# Patient Record
Sex: MALE | Race: BLACK OR AFRICAN AMERICAN | NOT HISPANIC OR LATINO | Employment: FULL TIME | URBAN - METROPOLITAN AREA
[De-identification: names, ages, dates, MRNs, and addresses within clinical notes are randomized per-mention and may not be internally consistent; named-entity substitution may affect disease eponyms.]

---

## 2018-01-11 ENCOUNTER — OFFICE VISIT (OUTPATIENT)
Dept: MEDICAL GROUP | Facility: MEDICAL CENTER | Age: 48
End: 2018-01-11
Payer: COMMERCIAL

## 2018-01-11 VITALS
OXYGEN SATURATION: 96 % | DIASTOLIC BLOOD PRESSURE: 86 MMHG | BODY MASS INDEX: 28.12 KG/M2 | WEIGHT: 207.6 LBS | HEIGHT: 72 IN | HEART RATE: 77 BPM | TEMPERATURE: 98.3 F | SYSTOLIC BLOOD PRESSURE: 128 MMHG

## 2018-01-11 DIAGNOSIS — M25.521 RIGHT ELBOW PAIN: ICD-10-CM

## 2018-01-11 DIAGNOSIS — Z12.5 SCREENING FOR MALIGNANT NEOPLASM OF PROSTATE: ICD-10-CM

## 2018-01-11 DIAGNOSIS — Z00.00 PREVENTATIVE HEALTH CARE: ICD-10-CM

## 2018-01-11 PROCEDURE — 99204 OFFICE O/P NEW MOD 45 MIN: CPT | Performed by: PHYSICIAN ASSISTANT

## 2018-01-11 RX ORDER — MELOXICAM 7.5 MG/1
7.5 TABLET ORAL DAILY
Qty: 30 TAB | Refills: 2 | Status: SHIPPED | OUTPATIENT
Start: 2018-01-11 | End: 2018-11-14 | Stop reason: SDUPTHER

## 2018-01-11 ASSESSMENT — PATIENT HEALTH QUESTIONNAIRE - PHQ9: CLINICAL INTERPRETATION OF PHQ2 SCORE: 0

## 2018-01-11 ASSESSMENT — PAIN SCALES - GENERAL: PAINLEVEL: 5=MODERATE PAIN

## 2018-01-11 NOTE — PROGRESS NOTES
Corey Huertas is a 47 y.o. new male here for establishing care.      HPI:    Patient complains of new onset right elbow pain X 1 yr. denies trauma to the area however it is possible that he injured it during heavy lifting in the gym. There is an area with swelling right below the elbow that causes him pain and discomfort. Pain is daily continue as 5 out of 10 which elevates to 9 out of 10 with heavy lifting. States he takes 4 ibuprofen to help with swelling sometimes. Patient has tingling numbness over both hands due to and her needed desk in the night. He is seeing the [place] prosthetic for that.      Current medicines (including changes today)  Current Outpatient Prescriptions   Medication Sig Dispense Refill   • meloxicam (MOBIC) 7.5 MG Tab Take 1 Tab by mouth every day. 30 Tab 2     No current facility-administered medications for this visit.      He  has no past medical history on file.  He  has a past surgical history that includes bone spur excision.  Social History   Substance Use Topics   • Smoking status: Light Tobacco Smoker   • Smokeless tobacco: Never Used   • Alcohol use 0.5 oz/week     1 Cans of beer per week      Comment: socially/weekends     Social History     Social History Narrative   • No narrative on file     Family History   Problem Relation Age of Onset   • Diabetes Mother    • Diabetes Sister    • Diabetes Maternal Grandmother    • Diabetes Maternal Grandfather    • Diabetes Paternal Grandmother    • Diabetes Paternal Grandfather      Family Status   Relation Status   • Mother Alive   • Sister Alive   • Brother Alive   • Sister Alive   • Sister Alive   • Maternal Grandmother    • Maternal Grandfather    • Paternal Grandmother    • Paternal Grandfather        Past medical, surgical, family, and social history is reviewed in Epic chart by me today.   Medications and allergies reviewed in Epic chart by me today.       ROS  General:  No fevers or chills, no night sweats or fatigue.   Eyes: no  "change in vision.   ENT:         Ears: No drainage. No change in hearing      Nose :No epitaxis        Mouth/ throat: No lesions. No sore throat  Resp: No difficulty breathing. No cough, wheezing.  CV: no SOB, no palpitation, no chest pain, no edema  GI: no nausea, no vomiting, no diarrhea or constipations. Bowel regular and normal. No melena or hematochezia. No hematemesis  : no Frequency, no urgency, no dysuria, no hematuria.   Skin: no rashes or abnormal lesions.   Neuro: No seizures, no tingling or numbness.   Endo: no polyuria, no polydypsia, no cold intolerance, no heat intolerance  Psych: no depression, no anxiety, no Drug/Alcohol abuse  Hem: no easy bruising.    As documented in history of present illness  ROS neg:  All other review of systems were reviewed and negative.             Objective:     Blood pressure 128/86, pulse 77, temperature 36.8 °C (98.3 °F), height 1.816 m (5' 11.5\"), weight 94.2 kg (207 lb 9.6 oz), SpO2 96 %. Body mass index is 28.55 kg/m².  Physical Exam:    Constitutional: Well-developed and well-nourished. No distress.   Skin: Skin is warm and dry. No rashes in visible areas.  Nail: w/o pitting, ridging or onychomycosis   Head: Atraumatic without lesions.  Eyes: Equal, round and reactive, conjunctiva clear,sclera non icteric, lids normal.  Ears:  External ears unremarkable. Tympanic membranes clear and intact.  Nose: Nares patent. Septum midline. Turbinates without erythema nor edema. No discharge.    Mouth/Throat: Dentition is good. Tongue normal. Oropharynx is clear and moist. Posterior pharynx without erythema or exudates.  Neck: Supple, trachea midline.  No thyromegaly present. No lymphadenopathy--cervical or supraclavicular  Cardiovascular: Regular rate and rhythm, without any murmurs.  No lower extremity edema. Cap refill < 3sec  Lung:  Clear to auscultation throughout w/o any wheeze or rhonchi. No adventitious sounds.    Abdomen: Soft, non tender, and without distention, No " CVA tenderness  Musculoskeletal: pos swelling without erythema or he below right elbow. Pain with internal rotation of the forearm against resistance  Neurological: speech normal, mental status intact, gait grossly normal  Psychiatric:   Alert and oriented x3, normal affect and mood and behavior    Assessment and Plan:   The following treatment plan was discussed    1. Preventative health care    - CBC WITH DIFFERENTIAL; Future  - COMP METABOLIC PANEL; Future  - LIPID PROFILE; Future  - TSH WITH REFLEX TO FT4; Future  - VITAMIN D,25 HYDROXY; Future    2. Screening for malignant neoplasm of prostate    - PROSTATE SPECIFIC AG SCREENING; Future    3. Right elbow pain    - REFERRAL TO SPORTS MEDICINE  - meloxicam (MOBIC) 7.5 MG Tab; Take 1 Tab by mouth every day.  Dispense: 30 Tab; Refill: 2      Followup: Return if symptoms worsen or fail to improve.         Please note that this dictation was created using voice recognition software. I have made every reasonable attempt to correct obvious errors, but I expect that there are errors of grammar and possibly content that I did not discover before finalizing the note

## 2018-09-26 ENCOUNTER — OFFICE VISIT (OUTPATIENT)
Dept: MEDICAL GROUP | Facility: MEDICAL CENTER | Age: 48
End: 2018-09-26
Payer: COMMERCIAL

## 2018-09-26 VITALS
OXYGEN SATURATION: 96 % | BODY MASS INDEX: 44.1 KG/M2 | HEART RATE: 89 BPM | DIASTOLIC BLOOD PRESSURE: 82 MMHG | SYSTOLIC BLOOD PRESSURE: 138 MMHG | HEIGHT: 71 IN | RESPIRATION RATE: 97 BRPM | WEIGHT: 315 LBS | TEMPERATURE: 98.7 F

## 2018-09-26 DIAGNOSIS — M25.531 PAIN IN BOTH WRISTS: ICD-10-CM

## 2018-09-26 DIAGNOSIS — M25.532 PAIN IN BOTH WRISTS: ICD-10-CM

## 2018-09-26 PROCEDURE — 99214 OFFICE O/P EST MOD 30 MIN: CPT | Performed by: PHYSICIAN ASSISTANT

## 2018-09-26 RX ORDER — PREDNISONE 20 MG/1
TABLET ORAL
Qty: 5 TAB | Refills: 0 | Status: SHIPPED | OUTPATIENT
Start: 2018-09-26 | End: 2020-10-12

## 2018-09-26 ASSESSMENT — PAIN SCALES - GENERAL: PAINLEVEL: 10=SEVERE PAIN

## 2018-09-26 NOTE — PROGRESS NOTES
"Chief Complaint   Patient presents with   • Wrist Pain     (B)        JANA Huertas is a 48 y.o. male here today for bilateral wrist pain X 2 wks. Pain is intermittent, however it is sharp and shooting when it happens.  The pain is over the dorsal aspect of wrists and radiates to the hand without affecting the fingers.  No tingling numbness over the fingers.  States he has history of bilateral wrist tendinitis 20 yrs ago.  Has tried tylenol without much help.  Denies any trauma to the area.  Patient has not been able to do up her body workout.  He is to go to gym multiple times a week.  He is a  and this has been affecting his job.    Past medical, surgical, family, and social history is reviewed in Epic chart by me today.   Medications and allergies reviewed and updated in Epic chart by me today.     ROS:   As documented in history of present illness above    Exam:  Blood pressure 138/82, pulse 89, temperature 37.1 °C (98.7 °F), resp. rate (!) 97, height 1.816 m (5' 11.5\"), weight (!) 204 kg (449 lb 11.8 oz), SpO2 96 %.  Constitutional: Alert, no distress, plus 3 vital signs  Skin:  Warm, dry, no rashes invisible areas  Eye: Equal, round and reactive, conjunctiva clear  MS:  intact.  No sensory deficits, no tenderness to palpation over bony prominence of wrists bilaterally.  Psych: Alert, pleasant, well-groomed, normal affect    A/P:  1. Pain in both wrists  Discussed rest, wrist brace if possible.  I am not convinced this is carpal tunnel due to the location of the pain, pain is over the dorsal aspect of wrists more over the medial part.  - REFERRAL TO SPORTS MEDICINE  - predniSONE (DELTASONE) 20 MG Tab; Take 20 mg day 1-4, take 10 mg day 5-6  Dispense: 5 Tab; Refill: 0  -Meloxicam 7.5 mg daily.  F/u prn   "

## 2018-11-14 DIAGNOSIS — M25.521 RIGHT ELBOW PAIN: ICD-10-CM

## 2018-11-14 RX ORDER — MELOXICAM 7.5 MG/1
7.5 TABLET ORAL DAILY
Qty: 14 TAB | Refills: 0 | Status: SHIPPED | OUTPATIENT
Start: 2018-11-14 | End: 2020-10-12

## 2018-11-14 NOTE — TELEPHONE ENCOUNTER
Was the patient seen in the last year in this department? Yes    Does patient have an active prescription for medications requested? No     Received Request Via: Pharmacy     Ashley Roberts.

## 2018-11-15 NOTE — TELEPHONE ENCOUNTER
Phone Number Called: [phone] (home)     Message: LVM for pt. To call back to schedule an appointment to come in and get refills.    Left Message for patient to call back: yes

## 2020-02-06 ENCOUNTER — OFFICE VISIT (OUTPATIENT)
Dept: URGENT CARE | Facility: CLINIC | Age: 50
End: 2020-02-06
Payer: COMMERCIAL

## 2020-02-06 ENCOUNTER — APPOINTMENT (OUTPATIENT)
Dept: RADIOLOGY | Facility: IMAGING CENTER | Age: 50
End: 2020-02-06
Attending: PHYSICIAN ASSISTANT
Payer: COMMERCIAL

## 2020-02-06 VITALS
HEIGHT: 71 IN | TEMPERATURE: 97 F | RESPIRATION RATE: 16 BRPM | BODY MASS INDEX: 25.2 KG/M2 | DIASTOLIC BLOOD PRESSURE: 96 MMHG | OXYGEN SATURATION: 97 % | WEIGHT: 180 LBS | SYSTOLIC BLOOD PRESSURE: 150 MMHG | HEART RATE: 80 BPM

## 2020-02-06 DIAGNOSIS — R63.4 WEIGHT LOSS: ICD-10-CM

## 2020-02-06 DIAGNOSIS — J98.01 BRONCHOSPASM: ICD-10-CM

## 2020-02-06 DIAGNOSIS — R06.03 ACUTE RESPIRATORY DISTRESS: ICD-10-CM

## 2020-02-06 DIAGNOSIS — I10 HYPERTENSION, UNSPECIFIED TYPE: ICD-10-CM

## 2020-02-06 DIAGNOSIS — J18.9 PNEUMONIA OF RIGHT LOWER LOBE DUE TO INFECTIOUS ORGANISM: ICD-10-CM

## 2020-02-06 DIAGNOSIS — R05.9 COUGH: ICD-10-CM

## 2020-02-06 PROCEDURE — 99204 OFFICE O/P NEW MOD 45 MIN: CPT | Performed by: PHYSICIAN ASSISTANT

## 2020-02-06 PROCEDURE — 71046 X-RAY EXAM CHEST 2 VIEWS: CPT | Mod: TC | Performed by: PHYSICIAN ASSISTANT

## 2020-02-06 RX ORDER — PROMETHAZINE HYDROCHLORIDE AND CODEINE PHOSPHATE 6.25; 1 MG/5ML; MG/5ML
5 SYRUP ORAL EVERY 12 HOURS PRN
Qty: 120 ML | Refills: 0 | Status: SHIPPED | OUTPATIENT
Start: 2020-02-06 | End: 2020-02-13

## 2020-02-06 RX ORDER — ONDANSETRON 4 MG/1
4 TABLET, FILM COATED ORAL EVERY 6 HOURS PRN
Qty: 20 TAB | Refills: 1 | Status: SHIPPED | OUTPATIENT
Start: 2020-02-06 | End: 2020-10-12

## 2020-02-06 RX ORDER — DOXYCYCLINE 100 MG/1
100 CAPSULE ORAL 2 TIMES DAILY
Qty: 10 CAP | Refills: 0 | Status: SHIPPED | OUTPATIENT
Start: 2020-02-06 | End: 2020-02-11

## 2020-02-06 RX ORDER — AMOXICILLIN AND CLAVULANATE POTASSIUM 875; 125 MG/1; MG/1
1 TABLET, FILM COATED ORAL 2 TIMES DAILY
Qty: 10 TAB | Refills: 0 | Status: SHIPPED | OUTPATIENT
Start: 2020-02-06 | End: 2020-02-11

## 2020-02-06 RX ORDER — METHYLPREDNISOLONE 4 MG/1
TABLET ORAL
Qty: 21 TAB | Refills: 0 | Status: SHIPPED | OUTPATIENT
Start: 2020-02-06 | End: 2020-10-12

## 2020-02-06 ASSESSMENT — ENCOUNTER SYMPTOMS
SHORTNESS OF BREATH: 0
WHEEZING: 0
BLOOD IN STOOL: 0
SORE THROAT: 0
FEVER: 1
CONSTIPATION: 1
MYALGIAS: 1
NAUSEA: 1
CHILLS: 1
ABDOMINAL PAIN: 0
DIZZINESS: 1
HEMOPTYSIS: 0
SPUTUM PRODUCTION: 1
COUGH: 1
WEIGHT LOSS: 1
PALPITATIONS: 0
VOMITING: 0
DIARRHEA: 0

## 2020-02-06 NOTE — PROGRESS NOTES
"  Subjective:     Corey Huertas  is a 49 y.o. male who presents for Cough (x 1 wk, productive cough, shortness of breath, dizzness, weakness, shaky, lost 30 lb in 1 wk)       Patient comes clinic complaining of last 1 week of persistent productive coughing.  He notes yellow and green production with coughing.  Denies hemoptysis.  He has noted waxing waning fevers and chills.  Notes he had a fever last weekend resolved for a few days and then returned last night.  Complains of chest pain with coughing.  Notes cough is causing chest congestion.  Patient complains of significant weight loss over the last 1 week.  He approximates over 25 to 30 pounds of weight loss over this interim.  He denies diarrhea or vomiting.  He complains of nausea without abdominal pain.  He notes nausea without eating.  He states he is minimally eaten over the last 1 week.  He notes \"only 2 bowel movements since he has not been eating this week\".  He complains of pleuritic type chest pain.  Denies exertional chest pain.  Denies palpable chest pain.  Of note patient is hypertensive in clinic.  He denies past medical history of hypertension.  Says he has not seen his primary care for over a year and does not check his blood pressure regularly.  He suspects history of pneumonia and bronchitis without clear diagnosis or timeframe. Pt unclear about recent weight loss; patient fairly aggressive and combative with history & questioning. We discuss recommendations to avoid stimulant decongestants or cough cold medicines with elevated blood pressure reading today.  Recommendation for primary care follow-up referral placed today with unclear recent weight loss. Recommendation for ER evaluation with ANY acutely worsening symptoms.     Cough   This is a new problem. The current episode started in the past 7 days. Associated symptoms include chest pain ( Primarily pleuritic, with respirations/cough), chills, ear pain, a fever, myalgias and weight loss. " Pertinent negatives include no hemoptysis, rash, sore throat, shortness of breath or wheezing.   History reviewed. No pertinent past medical history.  Past Surgical History:   Procedure Laterality Date   • BONE SPUR EXCISION       Social History     Socioeconomic History   • Marital status: Unknown     Spouse name: Not on file   • Number of children: Not on file   • Years of education: Not on file   • Highest education level: Not on file   Occupational History   • Not on file   Social Needs   • Financial resource strain: Not on file   • Food insecurity:     Worry: Not on file     Inability: Not on file   • Transportation needs:     Medical: Not on file     Non-medical: Not on file   Tobacco Use   • Smoking status: Light Tobacco Smoker     Types: Cigarettes   • Smokeless tobacco: Never Used   Substance and Sexual Activity   • Alcohol use: Yes     Alcohol/week: 0.5 oz     Types: 1 Cans of beer per week     Comment: socially/weekends   • Drug use: No   • Sexual activity: Yes     Partners: Female   Lifestyle   • Physical activity:     Days per week: Not on file     Minutes per session: Not on file   • Stress: Not on file   Relationships   • Social connections:     Talks on phone: Not on file     Gets together: Not on file     Attends Judaism service: Not on file     Active member of club or organization: Not on file     Attends meetings of clubs or organizations: Not on file     Relationship status: Not on file   • Intimate partner violence:     Fear of current or ex partner: Not on file     Emotionally abused: Not on file     Physically abused: Not on file     Forced sexual activity: Not on file   Other Topics Concern   • Not on file   Social History Narrative   • Not on file      Family History   Problem Relation Age of Onset   • Diabetes Mother    • Diabetes Sister    • Diabetes Maternal Grandmother    • Diabetes Maternal Grandfather    • Diabetes Paternal Grandmother    • Diabetes Paternal Grandfather     Review  "of Systems   Constitutional: Positive for chills, fever, malaise/fatigue and weight loss.   HENT: Positive for congestion and ear pain. Negative for sore throat.    Respiratory: Positive for cough and sputum production. Negative for hemoptysis, shortness of breath and wheezing.    Cardiovascular: Positive for chest pain ( Primarily pleuritic, with respirations/cough). Negative for palpitations and leg swelling.   Gastrointestinal: Positive for constipation and nausea. Negative for abdominal pain, blood in stool, diarrhea, melena and vomiting.   Genitourinary: Negative.    Musculoskeletal: Positive for myalgias.   Skin: Negative for rash.   Neurological: Positive for dizziness.   No Known Allergies   I have worn a mask for the entire encounter with this patient.    Objective:   /96   Pulse 80   Temp 36.1 °C (97 °F) (Temporal)   Resp 16   Ht 1.803 m (5' 11\")   Wt 81.6 kg (180 lb)   SpO2 97%   BMI 25.10 kg/m²   Physical Exam  Vitals signs and nursing note reviewed.   Constitutional:       General: He is not in acute distress.     Appearance: He is well-developed. He is not diaphoretic.   HENT:      Head: Normocephalic and atraumatic.      Right Ear: Tympanic membrane, ear canal and external ear normal.      Left Ear: Tympanic membrane, ear canal and external ear normal.      Nose: Nose normal.      Mouth/Throat:      Pharynx: Uvula midline. Posterior oropharyngeal erythema ( mild PND) present. No oropharyngeal exudate.      Tonsils: No tonsillar abscesses.   Eyes:      General: No scleral icterus.        Right eye: No discharge.         Left eye: No discharge.      Conjunctiva/sclera: Conjunctivae normal.   Neck:      Musculoskeletal: Neck supple.   Cardiovascular:      Rate and Rhythm: Regular rhythm. Tachycardia present.      Pulses: Normal pulses.   Pulmonary:      Effort: Pulmonary effort is normal. No accessory muscle usage or respiratory distress.      Breath sounds: Examination of the right-lower " field reveals rhonchi. Examination of the left-lower field reveals rhonchi. Rhonchi present. No decreased breath sounds, wheezing or rales.   Musculoskeletal: Normal range of motion.   Lymphadenopathy:      Cervical: Cervical adenopathy ( mild bilat) present.   Skin:     General: Skin is warm and dry.      Coloration: Skin is not pale.   Neurological:      Mental Status: He is alert and oriented to person, place, and time.      Coordination: Coordination normal.     ECG - EKG in the office reveals a normal sinus rhythm with a rate of 59. There is no ectopy, no ST elevation, depression, no signs of ischemia or infarct.    CXR -    IMPRESSION:     1.  There is ill-defined right lower lobe opacity suspicious for pneumonitis.            Last Resulted: 02/06/20  1:12 PM            Assessment/Plan:   Assessment    1. Pneumonia of right lower lobe due to infectious organism (HCC)  - DX-CHEST-2 VIEWS; Future  - amoxicillin-clavulanate (AUGMENTIN) 875-125 MG Tab; Take 1 Tab by mouth 2 times a day for 5 days.  Dispense: 10 Tab; Refill: 0  - doxycycline (MONODOX) 100 MG capsule; Take 1 Cap by mouth 2 times a day for 5 days.  Dispense: 10 Cap; Refill: 0  - methylPREDNISolone (MEDROL DOSEPAK) 4 MG Tablet Therapy Pack; Follow schedule on package instructions.  Dispense: 21 Tab; Refill: 0  - ondansetron (ZOFRAN) 4 MG Tab tablet; Take 1 Tab by mouth every 6 hours as needed for Nausea/Vomiting.  Dispense: 20 Tab; Refill: 1    2. Cough  - DX-CHEST-2 VIEWS; Future  - EKG  - promethazine-codeine (PHENERGAN-CODEINE) 6.25-10 MG/5ML Syrup; Take 5 mL by mouth every 12 hours as needed for Cough for up to 7 days.  Dispense: 120 mL; Refill: 0    3. Weight loss  - REFERRAL TO FAMILY PRACTICE    4. Hypertension, unspecified type  - REFERRAL TO FAMILY PRACTICE  Supportive care is reviewed with patient/caregiver - recommend to push PO fluids and electrolytes, Nsaids/tylenol, netti pot/saline irrig, humidifier in home, Cautioned regarding  potential side effects of steroid, avoid nsaids while using   take full course of Rx, take with probiotics, observe for resolution  Return to clinic with lack of resolution or progression of symptoms.    Cautioned regarding potential for sedation with medication.  F/u w/ PCP - referral today  ER precautions with any worsening symptoms are reviewed with patient/caregiver and they do express understanding -- with constellation of complaints patient is directed to ER w/ ANY worsening    Differential diagnosis, natural history, supportive care, and indications for immediate follow-up discussed.

## 2020-02-06 NOTE — LETTER
February 6, 2020       Patient: Corey Huertas   YOB: 1970   Date of Visit: 2/6/2020         To Whom It May Concern:    It is my medical opinion that Corey Huertas should be excused from yesterday, today and tomorrow due to illness.      If you have any questions or concerns, please don't hesitate to call [phone]          Sincerely,          Bret Chinchilla P.A.-C.  Electronically Signed

## 2020-02-14 ENCOUNTER — OFFICE VISIT (OUTPATIENT)
Dept: MEDICAL GROUP | Facility: MEDICAL CENTER | Age: 50
End: 2020-02-14
Payer: COMMERCIAL

## 2020-02-14 VITALS
SYSTOLIC BLOOD PRESSURE: 120 MMHG | TEMPERATURE: 97.8 F | DIASTOLIC BLOOD PRESSURE: 70 MMHG | HEIGHT: 72 IN | WEIGHT: 177.47 LBS | HEART RATE: 80 BPM | OXYGEN SATURATION: 97 % | BODY MASS INDEX: 24.04 KG/M2 | RESPIRATION RATE: 20 BRPM

## 2020-02-14 DIAGNOSIS — J18.9 PNEUMONIA OF RIGHT LOWER LOBE DUE TO INFECTIOUS ORGANISM: ICD-10-CM

## 2020-02-14 PROCEDURE — 99213 OFFICE O/P EST LOW 20 MIN: CPT | Performed by: PHYSICIAN ASSISTANT

## 2020-02-14 ASSESSMENT — PATIENT HEALTH QUESTIONNAIRE - PHQ9: CLINICAL INTERPRETATION OF PHQ2 SCORE: 0

## 2020-02-14 NOTE — PROGRESS NOTES
"Chief Complaint   Patient presents with   • Follow-Up   • Other     letter to return to work       HPI  Corey Huertas is a 49 y.o. male here today for follow-up on right lower lobe pneumonia.  Patient was seen at urgent care and x-ray showed consolidation of the right lower lobe.  Patient was treated for pneumonia with Augmentin and doxycycline with Medrol Dosepak.  States he follows a lot better.  Still has some fatigue and tiredness on exertion.  Cough has resolved.  No fevers or chills, no nausea or vomiting.       Past medical, surgical, family, and social history is reviewed in Epic chart by me today.   Medications and allergies reviewed and updated in Epic chart by me today.     ROS:   As documented in history of present illness above    Exam:  /70 (BP Location: Right arm, Patient Position: Sitting, BP Cuff Size: Adult)   Pulse 80   Temp 36.6 °C (97.8 °F) (Temporal)   Resp 20   Ht 1.816 m (5' 11.5\")   Wt 80.5 kg (177 lb 7.5 oz)   SpO2 97%   Constitutional: Alert, no distress, plus 3 vital signs  Skin:  Warm, dry, no rashes invisible areas  Eye: Equal, round and reactive, conjunctiva clear  Respiratory: Unlabored respiratory effort, lungs clear to auscultation, no wheezes, no rhonchi  Cardiovascular: RRR, no murmur, no lower extremities edema,  Psych: Alert, pleasant, well-groomed, normal affect    A/P:  1. Pneumonia of right lower lobe due to infectious organism (HCC)  Resolved,  Patient was on FMLA, back to work form is signed    F/u prn   "

## 2020-02-14 NOTE — LETTER
February 14, 2020         Patient: Corey Huertas   YOB: 1970   Date of Visit: 2/14/2020           To Whom it May Concern:    Corey Huertas was seen in my clinic on 2/14/2020. He may return to work 2/21/20    If you have any questions or concerns, please don't hesitate to call.        Sincerely,           Ashley Ovalle P.A.-C.  Electronically Signed

## 2020-10-12 ENCOUNTER — OFFICE VISIT (OUTPATIENT)
Dept: MEDICAL GROUP | Facility: MEDICAL CENTER | Age: 50
End: 2020-10-12
Payer: COMMERCIAL

## 2020-10-12 VITALS
BODY MASS INDEX: 25.14 KG/M2 | OXYGEN SATURATION: 100 % | SYSTOLIC BLOOD PRESSURE: 112 MMHG | DIASTOLIC BLOOD PRESSURE: 72 MMHG | RESPIRATION RATE: 16 BRPM | WEIGHT: 185.63 LBS | TEMPERATURE: 98.9 F | HEIGHT: 72 IN | HEART RATE: 67 BPM

## 2020-10-12 DIAGNOSIS — Z23 NEED FOR VACCINATION: ICD-10-CM

## 2020-10-12 DIAGNOSIS — B36.0 TINEA VERSICOLOR: ICD-10-CM

## 2020-10-12 DIAGNOSIS — Z72.0 TOBACCO USE: ICD-10-CM

## 2020-10-12 DIAGNOSIS — M25.522 LEFT ELBOW PAIN: ICD-10-CM

## 2020-10-12 DIAGNOSIS — Z00.00 ENCOUNTER FOR PREVENTIVE CARE: ICD-10-CM

## 2020-10-12 DIAGNOSIS — E78.5 DYSLIPIDEMIA: ICD-10-CM

## 2020-10-12 PROCEDURE — 90715 TDAP VACCINE 7 YRS/> IM: CPT | Performed by: INTERNAL MEDICINE

## 2020-10-12 PROCEDURE — 99204 OFFICE O/P NEW MOD 45 MIN: CPT | Mod: 25 | Performed by: INTERNAL MEDICINE

## 2020-10-12 PROCEDURE — 90471 IMMUNIZATION ADMIN: CPT | Performed by: INTERNAL MEDICINE

## 2020-10-12 RX ORDER — SELENIUM SULFIDE 22.5 MG/ML
1 SHAMPOO TOPICAL DAILY
Qty: 180 ML | Refills: 1 | Status: SHIPPED | OUTPATIENT
Start: 2020-10-12 | End: 2020-10-19

## 2020-10-12 NOTE — PATIENT INSTRUCTIONS
Tennis Elbow    Tennis elbow (lateral epicondylitis) is inflammation of tendons in your outer forearm, near your elbow. Tendons are tissues that connect muscle to bone. When you have tennis elbow, inflammation affects the tendons that you use to bend your wrist and move your hand up. Inflammation occurs in the lower part of the upper arm bone (humerus), where the tendons connect to the bone (lateral epicondyle).  Tennis elbow often affects people who play tennis, but anyone may get the condition from repeatedly extending the wrist or turning the forearm.  What are the causes?  This condition is usually caused by repeatedly extending the wrist, turning the forearm, and using the hands. It can result from sports or work that requires repetitive forearm movements. In some cases, it may be caused by a sudden injury.  What increases the risk?  You are more likely to develop tennis elbow if you play tennis or another racket sport. You also have a higher risk if you frequently use your hands for work. Besides people who play tennis, others at greater risk include:  · Musicians.  · , painters, and plumbers.  · Cooks.  · [place].  · People who work in factories.  · Construction workers.  · Butchers.  · People who use computers.  What are the signs or symptoms?  Symptoms of this condition include:  · Pain and tenderness in the forearm and the outer part of the elbow. Pain may be felt only when using the arm, or it may be there all the time.  · A burning feeling that starts in the elbow and spreads down the forearm.  · A weak  in the hand.  How is this diagnosed?  This condition may be diagnosed based on:  · Your symptoms and medical history.  · A physical exam.  · X-rays.  · MRI.  How is this treated?  Resting and icing your arm is often the first treatment. Your health care provider may also recommend:  · Medicines to reduce pain and inflammation. These may be in the form of a pill, topical gels, or shots of a  steroid medicine (cortisone).  · An elbow strap to reduce stress on the area.  · Physical therapy. This may include massage or exercises.  · An elbow brace to restrict the movements that cause symptoms.  If these treatments do not help relieve your symptoms, your health care provider may recommend surgery to remove damaged muscle and reattach healthy muscle to bone.  Follow these instructions at home:  Activity  · Rest your elbow and wrist and avoid activities that cause symptoms, as told by your health care provider.  · Do physical therapy exercises as instructed.  · If you lift an object, lift it with your palm facing up. This reduces stress on your elbow.  Lifestyle  · If your tennis elbow is caused by sports, check your equipment and make sure that:  ? You are using it correctly.  ? It is the best fit for you.  · If your tennis elbow is caused by work or computer use, take frequent breaks to stretch your arm. Talk with your manager about ways to manage your condition at work.  If you have a brace:  · Wear the brace or strap as told by your health care provider. Remove it only as told by your health care provider.  · Loosen the brace if your fingers tingle, become numb, or turn cold and blue.  · Keep the brace clean.  · If the brace is not waterproof, ask if you may remove it for bathing. If you must keep the brace on while bathing:  ? Do not let it get wet.  ? Cover it with a watertight covering when you take a bath or a shower.  General instructions    · If directed, put ice on the painful area:  ? Put ice in a plastic bag.  ? Place a towel between your skin and the bag.  ? Leave the ice on for 20 minutes, 2-3 times a day.  · Take over-the-counter and prescription medicines only as told by your health care provider.  · Keep all follow-up visits as told by your health care provider. This is important.  Contact a health care provider if:  · You have pain that gets worse or does not get better with  treatment.  · You have numbness or weakness in your forearm, hand, or fingers.  Summary  · Tennis elbow (lateral epicondylitis) is inflammation of tendons in your outer forearm, near your elbow.  · Common symptoms include pain and tenderness in your forearm and the outer part of your elbow.  · This condition is usually caused by repeatedly extending your wrist, turning your forearm, and using your hands.  · The first treatment is often resting and icing your arm to relieve symptoms. Further treatment may include taking medicine, getting physical therapy, wearing a brace or strap, or having surgery.  This information is not intended to replace advice given to you by your health care provider. Make sure you discuss any questions you have with your health care provider.  Document Released: 12/18/2006 Document Revised: 09/13/2019 Document Reviewed: 10/02/2018  [employer] Patient Education © 2020 [employer]

## 2020-10-12 NOTE — PROGRESS NOTES
"New Patient to Establish    Reason to establish: New patient to establish    Corey Huertas is a 50 y.o. male who presents today with the following:    CC:   Chief Complaint   Patient presents with   • Establish Care   • Elbow Pain     L elbow, x 2 mos.       HPI:     Left elbow pain  Left lateral elbow pain, worse with turning wrench     He lifts bumbbells  Now pushup, pull ups  He regularly does kickboxing and lifting    Denies significant injuries        Tinea versicolor  Hypopigmented slight scaly rash on trunk and proximal upper extremities, worse in summer times.       Dyslipidemia  Lifestyle modifications            Current Outpatient Medications:   •  Selenium Sulfide 2.25 % Shampoo, 1 Application by Apply externally route every day for 7 days. Apply to affected area and lather with small amounts of water; leave on skin for 10 minutes, then rinse thoroughly, Disp: 180 mL, Rfl: 1    Allergies, past medical history, past surgical history, medications, family history, social history reviewed and updated.    ROS     Constitutional: Denies fevers or chills  Eyes: Denies changes in vision  Ears/Nose/Throat/Mouth: Denies nasal congestion or sore throat   Cardiovascular: Denies chest pain or palpitations   Respiratory: Denies shortness of breath , Denies cough  Gastrointestinal/Hepatic: Denies abd pain, nausea, vomiting   Genitourinary: Denies dysuria or frequency  Musculoskeletal/Rheum: left lateral elbow tenderness  Neurological: Denies headache  Psychiatric: Denies mood disorder   Endocrine: Denies hx of diabetes or thyroid dysfunction  Heme/Oncology/Lymph Nodes: Denies weight changes or enlarged LNs.    Physical Exam  /72 (BP Location: Left arm, Patient Position: Sitting, BP Cuff Size: Adult)   Pulse 67   Temp 37.2 °C (98.9 °F) (Temporal)   Resp 16   Ht 1.816 m (5' 11.5\")   Wt 84.2 kg (185 lb 10 oz)   SpO2 100%   BMI 25.53 kg/m²   General: Normal appearance.  Well developed, well nourished, no acute " distress.  HEENT: Normocephalic.  Extraocular motion intact. Pupils are equally round, reactive to light and accommodation, conjunctiva clear, no scleral icterus.  Ears: normal shape and contour, ear canals clear, tympanic membranes intact. Hearing intact.  Oropharynx clear, no erythema, edema or exudate noted.  NECK: Thyroid is not enlarged. No JVD.  No carotid bruits. No masses.  Cardiovascular: Regular rhythm and rate.   Respiratory: Normal respiratory effort, clear to auscultation bilaterally. No wheezing/rales/rhonchi.    Abdomen: Bowel sounds present, soft, nontender, nondistended, no rebound, no guarding. No hepatosplenomegaly.  : No suprapubic tenderness. No CVA tenderness.   EXT: left lateral elbow tenderness. no LE edema b/l. No cyanosis.  No clubbing.  Lymph: No cervical, supraclavicular or axillary lymph nodes are palpable  Skin: Warm and dry.  No suspicious lesions or rashes.   Neurologic: No focal deficits.    Psych: AAOx3,  Normal mood and affect, normal judgment and insight, memory within normal limits      Assessment and Plan    1. Left elbow pain  - REFERRAL TO SPORTS MEDICINE  - DX-ELBOW-COMPLETE 3+ LEFT; Future  Continue tylenol prn, topical biofreeze prn  Trial of elbow brace, avoid activities that cause symptoms    2. Dyslipidemia  - Lipid Profile; Future    3. Tinea versicolor  - Selenium Sulfide 2.25 % Shampoo; 1 Application by Apply externally route every day for 7 days. Apply to affected area and lather with small amounts of water; leave on skin for 10 minutes, then rinse thoroughly  Dispense: 180 mL; Refill: 1    4. Encounter for preventive care  - CBC WITH DIFFERENTIAL; Future  - Comp Metabolic Panel; Future  - TSH WITH REFLEX TO FT4; Future  - VITAMIN D,25 HYDROXY; Future  - URINALYSIS; Future    5. Need for vaccination  - Tdap Vaccine =>6YO IM    6. Tobacco use  - Education and counseling provided for smoking cessation.    Patient declines flu shot, due to not feeling well after  getting flu shot every year.    Follow-up:Return if symptoms worsen or fail to improve.    This note was created using voice recognition software. There may be unintended errors in spelling, grammar or content.

## 2020-10-12 NOTE — ASSESSMENT & PLAN NOTE
Smoking 3 cig daily  Hx of smoking 0.25 pack for 10 years  - Education and counseling provided for smoking cessation.

## 2020-10-12 NOTE — ASSESSMENT & PLAN NOTE
Left lateral elbow pain, worse with turning wrench     He lifts bumbbells  Now pushup, pull ups  He regularly does kickboxing and lifting    Denies significant injuries

## 2020-10-12 NOTE — ASSESSMENT & PLAN NOTE
Hypopigmented slight scaly rash on trunk and proximal upper extremities, worse in summer times.

## 2020-10-20 ENCOUNTER — OFFICE VISIT (OUTPATIENT)
Dept: MEDICAL GROUP | Facility: CLINIC | Age: 50
End: 2020-10-20
Payer: COMMERCIAL

## 2020-10-20 VITALS
RESPIRATION RATE: 16 BRPM | HEIGHT: 72 IN | HEART RATE: 78 BPM | BODY MASS INDEX: 25.14 KG/M2 | DIASTOLIC BLOOD PRESSURE: 74 MMHG | WEIGHT: 185.63 LBS | TEMPERATURE: 98.3 F | SYSTOLIC BLOOD PRESSURE: 116 MMHG | OXYGEN SATURATION: 97 %

## 2020-10-20 DIAGNOSIS — M77.12 LATERAL EPICONDYLITIS OF LEFT ELBOW: ICD-10-CM

## 2020-10-20 PROCEDURE — 99203 OFFICE O/P NEW LOW 30 MIN: CPT | Performed by: FAMILY MEDICINE

## 2020-10-20 ASSESSMENT — ENCOUNTER SYMPTOMS
CHILLS: 0
SHORTNESS OF BREATH: 0
VOMITING: 0
FEVER: 0
NAUSEA: 0
DIZZINESS: 0

## 2020-10-20 NOTE — PATIENT INSTRUCTIONS
ARNICARE Gel, available at all major pharmacies (usually by the icy hot)    Or voltaren Gel
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
## 2020-11-10 ENCOUNTER — HOSPITAL ENCOUNTER (OUTPATIENT)
Dept: LAB | Facility: MEDICAL CENTER | Age: 50
End: 2020-11-10
Attending: INTERNAL MEDICINE
Payer: COMMERCIAL

## 2020-11-10 DIAGNOSIS — Z00.00 ENCOUNTER FOR PREVENTIVE CARE: ICD-10-CM

## 2020-11-10 DIAGNOSIS — E78.5 DYSLIPIDEMIA: ICD-10-CM

## 2020-11-10 LAB
25(OH)D3 SERPL-MCNC: 36 NG/ML (ref 30–100)
ALBUMIN SERPL BCP-MCNC: 4 G/DL (ref 3.2–4.9)
ALBUMIN/GLOB SERPL: 1.1 G/DL
ALP SERPL-CCNC: 50 U/L (ref 30–99)
ALT SERPL-CCNC: 18 U/L (ref 2–50)
ANION GAP SERPL CALC-SCNC: 12 MMOL/L (ref 7–16)
APPEARANCE UR: CLEAR
AST SERPL-CCNC: 25 U/L (ref 12–45)
BASOPHILS # BLD AUTO: 0.6 % (ref 0–1.8)
BASOPHILS # BLD: 0.06 K/UL (ref 0–0.12)
BILIRUB SERPL-MCNC: 0.5 MG/DL (ref 0.1–1.5)
BILIRUB UR QL STRIP.AUTO: NEGATIVE
BUN SERPL-MCNC: 14 MG/DL (ref 8–22)
CALCIUM SERPL-MCNC: 9.2 MG/DL (ref 8.5–10.5)
CHLORIDE SERPL-SCNC: 104 MMOL/L (ref 96–112)
CHOLEST SERPL-MCNC: 178 MG/DL (ref 100–199)
CO2 SERPL-SCNC: 23 MMOL/L (ref 20–33)
COLOR UR: YELLOW
CREAT SERPL-MCNC: 1.51 MG/DL (ref 0.5–1.4)
EOSINOPHIL # BLD AUTO: 0.19 K/UL (ref 0–0.51)
EOSINOPHIL NFR BLD: 2 % (ref 0–6.9)
ERYTHROCYTE [DISTWIDTH] IN BLOOD BY AUTOMATED COUNT: 48.2 FL (ref 35.9–50)
FASTING STATUS PATIENT QL REPORTED: NORMAL
GLOBULIN SER CALC-MCNC: 3.5 G/DL (ref 1.9–3.5)
GLUCOSE SERPL-MCNC: 107 MG/DL (ref 65–99)
GLUCOSE UR STRIP.AUTO-MCNC: NEGATIVE MG/DL
HCT VFR BLD AUTO: 49.1 % (ref 42–52)
HDLC SERPL-MCNC: 39 MG/DL
HGB BLD-MCNC: 16.4 G/DL (ref 14–18)
IMM GRANULOCYTES # BLD AUTO: 0.04 K/UL (ref 0–0.11)
IMM GRANULOCYTES NFR BLD AUTO: 0.4 % (ref 0–0.9)
KETONES UR STRIP.AUTO-MCNC: ABNORMAL MG/DL
LDLC SERPL CALC-MCNC: 122 MG/DL
LEUKOCYTE ESTERASE UR QL STRIP.AUTO: NEGATIVE
LYMPHOCYTES # BLD AUTO: 3.59 K/UL (ref 1–4.8)
LYMPHOCYTES NFR BLD: 38.6 % (ref 22–41)
MCH RBC QN AUTO: 32 PG (ref 27–33)
MCHC RBC AUTO-ENTMCNC: 33.4 G/DL (ref 33.7–35.3)
MCV RBC AUTO: 95.9 FL (ref 81.4–97.8)
MICRO URNS: ABNORMAL
MONOCYTES # BLD AUTO: 0.83 K/UL (ref 0–0.85)
MONOCYTES NFR BLD AUTO: 8.9 % (ref 0–13.4)
NEUTROPHILS # BLD AUTO: 4.58 K/UL (ref 1.82–7.42)
NEUTROPHILS NFR BLD: 49.5 % (ref 44–72)
NITRITE UR QL STRIP.AUTO: NEGATIVE
NRBC # BLD AUTO: 0 K/UL
NRBC BLD-RTO: 0 /100 WBC
PH UR STRIP.AUTO: 5 [PH] (ref 5–8)
PLATELET # BLD AUTO: 189 K/UL (ref 164–446)
PMV BLD AUTO: 11.2 FL (ref 9–12.9)
POTASSIUM SERPL-SCNC: 4 MMOL/L (ref 3.6–5.5)
PROT SERPL-MCNC: 7.5 G/DL (ref 6–8.2)
PROT UR QL STRIP: NEGATIVE MG/DL
RBC # BLD AUTO: 5.12 M/UL (ref 4.7–6.1)
RBC UR QL AUTO: NEGATIVE
SODIUM SERPL-SCNC: 139 MMOL/L (ref 135–145)
SP GR UR STRIP.AUTO: 1.03
TRIGL SERPL-MCNC: 85 MG/DL (ref 0–149)
TSH SERPL DL<=0.005 MIU/L-ACNC: 0.92 UIU/ML (ref 0.38–5.33)
UROBILINOGEN UR STRIP.AUTO-MCNC: 0.2 MG/DL
WBC # BLD AUTO: 9.3 K/UL (ref 4.8–10.8)

## 2020-11-10 PROCEDURE — 84443 ASSAY THYROID STIM HORMONE: CPT

## 2020-11-10 PROCEDURE — 81003 URINALYSIS AUTO W/O SCOPE: CPT

## 2020-11-10 PROCEDURE — 80053 COMPREHEN METABOLIC PANEL: CPT

## 2020-11-10 PROCEDURE — 80061 LIPID PANEL: CPT

## 2020-11-10 PROCEDURE — 82306 VITAMIN D 25 HYDROXY: CPT

## 2020-11-10 PROCEDURE — 85025 COMPLETE CBC W/AUTO DIFF WBC: CPT

## 2020-11-10 PROCEDURE — 36415 COLL VENOUS BLD VENIPUNCTURE: CPT

## 2020-11-12 ENCOUNTER — TELEPHONE (OUTPATIENT)
Dept: MEDICAL GROUP | Facility: MEDICAL CENTER | Age: 50
End: 2020-11-12

## 2020-11-12 NOTE — TELEPHONE ENCOUNTER
Called patient and left voicemail, asking patient to schedule a follow up appointment to go over lab results. Asked patient to call [phone] to schedule.

## 2020-11-12 NOTE — TELEPHONE ENCOUNTER
----- Message from Francisco Kumar M.D. sent at 11/10/2020  5:16 PM PST -----  Please call patient and schedule an appointment with Dr. Kumar in 2 weeks to review lab    Thanks!

## 2020-12-01 ENCOUNTER — OFFICE VISIT (OUTPATIENT)
Dept: MEDICAL GROUP | Facility: CLINIC | Age: 50
End: 2020-12-01
Payer: COMMERCIAL

## 2020-12-01 VITALS
SYSTOLIC BLOOD PRESSURE: 114 MMHG | DIASTOLIC BLOOD PRESSURE: 70 MMHG | HEIGHT: 72 IN | HEART RATE: 82 BPM | RESPIRATION RATE: 18 BRPM | TEMPERATURE: 98.3 F | OXYGEN SATURATION: 96 % | BODY MASS INDEX: 25.14 KG/M2 | WEIGHT: 185.63 LBS

## 2020-12-01 DIAGNOSIS — M77.12 LATERAL EPICONDYLITIS OF LEFT ELBOW: ICD-10-CM

## 2020-12-01 PROCEDURE — 99213 OFFICE O/P EST LOW 20 MIN: CPT | Performed by: FAMILY MEDICINE

## 2020-12-01 RX ORDER — NITROGLYCERIN 0.1MG/HR
PATCH, TRANSDERMAL 24 HOURS TRANSDERMAL
Qty: 30 PATCH | Refills: 0 | Status: SHIPPED | OUTPATIENT
Start: 2020-12-01 | End: 2021-01-07

## 2020-12-01 ASSESSMENT — FIBROSIS 4 INDEX: FIB4 SCORE: 1.53
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
## 2020-12-14 ENCOUNTER — TELEPHONE (OUTPATIENT)
Dept: MEDICAL GROUP | Facility: MEDICAL CENTER | Age: 50
End: 2020-12-14

## 2020-12-15 NOTE — TELEPHONE ENCOUNTER
Phone Number Called: [phone] (home)     Call outcome: Did not leave a detailed message. Requested patient to call back.    Message: attempted to contact pt to schedule appt to review labs

## 2020-12-15 NOTE — TELEPHONE ENCOUNTER
----- Message from Francisco Kumar M.D. sent at 12/14/2020 10:23 AM PST -----  Please call patient and schedule a virtual or in person appointment with Dr. Kumar in 2 weeks to review lab.    Thanks!

## 2020-12-18 ENCOUNTER — TELEPHONE (OUTPATIENT)
Dept: MEDICAL GROUP | Facility: MEDICAL CENTER | Age: 50
End: 2020-12-18

## 2020-12-18 NOTE — TELEPHONE ENCOUNTER
Phone Number Called: [phone] (home)     Message: attempted to contact pt to schedule in clinic appointment to review labs as pt does not have active [employer] account.

## 2021-01-06 DIAGNOSIS — M77.12 LATERAL EPICONDYLITIS OF LEFT ELBOW: ICD-10-CM

## 2021-01-07 RX ORDER — NITROGLYCERIN 0.1MG/HR
PATCH, TRANSDERMAL 24 HOURS TRANSDERMAL
Qty: 30 PATCH | Refills: 0 | Status: SHIPPED | OUTPATIENT
Start: 2021-01-07 | End: 2022-01-25

## 2021-04-23 ENCOUNTER — APPOINTMENT (OUTPATIENT)
Dept: MEDICAL GROUP | Facility: MEDICAL CENTER | Age: 51
End: 2021-04-23
Payer: COMMERCIAL

## 2021-11-23 ENCOUNTER — OFFICE VISIT (OUTPATIENT)
Dept: MEDICAL GROUP | Facility: MEDICAL CENTER | Age: 51
End: 2021-11-23
Payer: COMMERCIAL

## 2021-11-23 VITALS
SYSTOLIC BLOOD PRESSURE: 130 MMHG | HEIGHT: 71 IN | HEART RATE: 74 BPM | BODY MASS INDEX: 26.23 KG/M2 | TEMPERATURE: 98.4 F | WEIGHT: 187.39 LBS | DIASTOLIC BLOOD PRESSURE: 60 MMHG | OXYGEN SATURATION: 99 %

## 2021-11-23 DIAGNOSIS — Z72.0 TOBACCO USE: ICD-10-CM

## 2021-11-23 DIAGNOSIS — Z23 NEED FOR VACCINATION: ICD-10-CM

## 2021-11-23 DIAGNOSIS — R19.09 MASS OF LEFT INGUINAL REGION: ICD-10-CM

## 2021-11-23 DIAGNOSIS — N18.2 STAGE 2 CHRONIC KIDNEY DISEASE: ICD-10-CM

## 2021-11-23 DIAGNOSIS — Z12.11 SCREENING FOR COLON CANCER: ICD-10-CM

## 2021-11-23 DIAGNOSIS — R73.01 IMPAIRED FASTING GLUCOSE: ICD-10-CM

## 2021-11-23 DIAGNOSIS — E78.5 DYSLIPIDEMIA: ICD-10-CM

## 2021-11-23 DIAGNOSIS — B36.0 TINEA VERSICOLOR: ICD-10-CM

## 2021-11-23 DIAGNOSIS — Z00.00 ENCOUNTER FOR PREVENTIVE CARE: ICD-10-CM

## 2021-11-23 PROBLEM — R79.89 ELEVATED SERUM CREATININE: Status: ACTIVE | Noted: 2021-11-23

## 2021-11-23 PROCEDURE — 90471 IMMUNIZATION ADMIN: CPT | Performed by: INTERNAL MEDICINE

## 2021-11-23 PROCEDURE — 90732 PPSV23 VACC 2 YRS+ SUBQ/IM: CPT | Performed by: INTERNAL MEDICINE

## 2021-11-23 PROCEDURE — 99214 OFFICE O/P EST MOD 30 MIN: CPT | Mod: 25 | Performed by: INTERNAL MEDICINE

## 2021-11-23 RX ORDER — SELENIUM SULFIDE 22.5 MG/ML
SHAMPOO TOPICAL
Qty: 180 ML | Refills: 2 | Status: SHIPPED | OUTPATIENT
Start: 2021-11-23

## 2021-11-23 RX ORDER — SELENIUM SULFIDE 22.5 MG/ML
SHAMPOO TOPICAL
Qty: 180 ML | Refills: 2 | Status: SHIPPED | OUTPATIENT
Start: 2021-11-23 | End: 2021-11-23 | Stop reason: SDUPTHER

## 2021-11-23 ASSESSMENT — FIBROSIS 4 INDEX: FIB4 SCORE: 1.56

## 2021-11-23 ASSESSMENT — PATIENT HEALTH QUESTIONNAIRE - PHQ9: CLINICAL INTERPRETATION OF PHQ2 SCORE: 0

## 2021-11-23 NOTE — ASSESSMENT & PLAN NOTE
Smoking 3 cig daily  Hx of smoking 0.25 pack for 11 years  - Education and counseling provided for smoking cessation.

## 2021-11-23 NOTE — ASSESSMENT & PLAN NOTE
Elevated creatinine  GFR 60  - encourage oral hydration  - Avoid nephrotoxin. Avoid NSAIDs  - Renally dose medications.  - control BP and BG  - renal diet

## 2021-11-23 NOTE — PROGRESS NOTES
Established Patient    Corey Huertas is a 51 y.o. male who presents today with the following:    CC:   Chief Complaint   Patient presents with   • Annual Exam       HPI:     Preventive Care    BP (? 18, every visit): stable  Diabetes screening (BP>135/80):  Lipid panel (? 35M, ? 45F q5yr):  Aspirin (50-59 with a ?  10% 10 yr CVD risk):  Vit D (? 65):    Colonoscopy :referral placed  Flu: patient declines  Tdap: 10/12/2020  PPSV 23: today, due do smoking hx        Problem   Mass of Left Inguinal Region    Reducible left inguinal lump, enlarges when he tries to cough     Stage 2 Chronic Kidney Disease    Elevated creatinine  GFR 60  - encourage oral hydration  - Avoid nephrotoxin. Avoid NSAIDs  - Renally dose medications.  - control BP and BG  - renal diet         Elevated Serum Creatinine   Impaired Fasting Glucose       Ref. Range 11/10/2020 07:57   Glucose Latest Ref Range: 65 - 99 mg/dL 107 (H)        Dyslipidemia    Lifestyle modifications       Tobacco Use    Smoking 3 cig daily  Hx of smoking 0.25 pack for 11 years  - Education and counseling provided for smoking cessation.       Tinea Versicolor    Hypopigmented slight scaly rash on trunk and proximal upper extremities, worse in summer times.           Current Outpatient Medications   Medication Sig Dispense Refill   • Selenium Sulfide 2.25 % Shampoo 1 Application by Apply externally route every day for 7 days. Apply to affected area and lather with small amounts of water; leave on skin for 10 minutes, then rinse thoroughly 180 mL 2   • nitroglycerin (NITRODUR) 0.1 MG/HR PATCH 24 HR APPLY 1/4- 1/2 PATCH ON POINT OF MAX TENDERNESS. SWITCH PATCH EVERY 24 HOURS. OK TO CUT (Patient not taking: Reported on 11/23/2021) 30 Patch 0     No current facility-administered medications for this visit.       Allergies, past medical history, past surgical history, medications, family history, social history reviewed and updated.    ROS   Constitutional: Denies fevers or  "chills  Eyes: Denies changes in vision  Ears/Nose/Throat/Mouth: Denies nasal congestion or sore throat   Cardiovascular: Denies chest pain or palpitations   Respiratory: Denies shortness of breath , Denies cough  Gastrointestinal/Hepatic: Denies abd pain, nausea, vomiting   Genitourinary: Denies dysuria or frequency  Musculoskeletal/Rheum: Denies joint pain and swelling   Neurological: Denies headache  Psychiatric: Denies mood disorder   Endocrine: Denies hx of diabetes or thyroid dysfunction  Heme/Oncology/Lymph Nodes: Denies weight changes or enlarged LNs.    Physical Exam  Vitals: /60 (BP Location: Left arm, Patient Position: Sitting, BP Cuff Size: Adult)   Pulse 74   Temp 36.9 °C (98.4 °F) (Temporal)   Ht 1.803 m (5' 11\")   Wt 85 kg (187 lb 6.3 oz)   SpO2 99%   BMI 26.14 kg/m²   General: Alert, pleasant, NAD  HEENT: Normocephalic.  EOMI, no icterus or pallor.  Conjunctivae and lids normal. External ears normal. Wearing a mask. Oropharynx non-erythematous, mucous membranes moist.  Neck supple.  No thyromegaly or masses palpated.   Lymph: No cervical or supraclavicular lymphadenopathy.  Cardiovascular: Regular rate and rhythm.  S1 and S2 normal.  No murmurs appreciated.  Respiratory: Normal respiratory effort.  Clear to auscultation bilaterally.  Abdomen: Non-distended, soft, non-tender, left inguinal lump, more pronounced when standing and coughing  Skin: Warm, dry, Hypopigmented slight scaly rash on trunk and proximal upper extremities  Musculoskeletal: Gait is normal.  Moves all extremities well.  Extremities: No leg edema.  Radial pulses 2+ symmetric.   Psych:  Affect/mood is normal, judgement is good, memory is intact, grooming is appropriate.        Assessment and Plan    1. Mass of left inguinal region  - US-INGUINAL HERNIA; Future  Sometimes mild pain  ER precaution discussed    2. Need for vaccination  - Pneumococal Polysaccharide Vaccine 23-Valent =>3YO SQ/IM    3. Screening for colon " "cancer  - Referral to GI for Colonoscopy    4. Dyslipidemia  - Lipid Profile; Future  - TSH WITH REFLEX TO FT4; Future    5. Stage 2 chronic kidney disease  - CBC WITH DIFFERENTIAL; Future  - Comp Metabolic Panel; Future  - URINALYSIS,CULTURE IF INDICATED; Future  - US-RENAL; Future  - encourage oral hydration  - Avoid nephrotoxin. Avoid NSAIDs  - Renally dose medications.  - control BP and BG  - renal diet      6. Encounter for preventive care  - VITAMIN D,25 HYDROXY; Future    7. Tinea versicolor  - Selenium Sulfide 2.25 % Shampoo; 1 Application by Apply externally route every day for 7 days. Apply to affected area and lather with small amounts of water; leave on skin for 10 minutes, then rinse thoroughly  Dispense: 180 mL; Refill: 2    8. Impaired fasting glucose  - HEMOGLOBIN A1C; Future    9. Tobacco use  - Education and counseling provided for smoking cessation.  - PATIENT RESOURCE: Smokefree.gov 6-885-QUIT-NOW    Patient Counseling:  --Discussed Healthful lifestyle measures  --Reviewed sun protective behavior including sunscreen application and reapplication, appropriate choice of SPF, hat, protective clothing, seeking shade and never choosing to \"lie out\" in the sun.  --Discussed brushing, flossing, and dental visits.   --Encouraged regular exercise.   --- Education and counseling provided for smoking cessation.  --Injury prevention discussed      Follow-up:Return if symptoms worsen or fail to improve.    This note was created using voice recognition software. There may be unintended errors in spelling, grammar or content.    "

## 2021-11-23 NOTE — ASSESSMENT & PLAN NOTE
Results for CHARLA ALLEN (MRN [id number]) as of 11/23/2021 10:35   Ref. Range 11/10/2020 07:57   Glucose Latest Ref Range: 65 - 99 mg/dL 107 (H)

## 2021-12-17 ENCOUNTER — HOSPITAL ENCOUNTER (OUTPATIENT)
Dept: LAB | Facility: MEDICAL CENTER | Age: 51
End: 2021-12-17
Attending: INTERNAL MEDICINE
Payer: COMMERCIAL

## 2021-12-17 DIAGNOSIS — R73.01 IMPAIRED FASTING GLUCOSE: ICD-10-CM

## 2021-12-17 DIAGNOSIS — Z00.00 ENCOUNTER FOR PREVENTIVE CARE: ICD-10-CM

## 2021-12-17 DIAGNOSIS — N18.2 STAGE 2 CHRONIC KIDNEY DISEASE: ICD-10-CM

## 2021-12-17 DIAGNOSIS — E78.5 DYSLIPIDEMIA: ICD-10-CM

## 2021-12-17 LAB
ALBUMIN SERPL BCP-MCNC: 4.5 G/DL (ref 3.2–4.9)
ALBUMIN/GLOB SERPL: 1.2 G/DL
ALP SERPL-CCNC: 66 U/L (ref 30–99)
ALT SERPL-CCNC: 24 U/L (ref 2–50)
ANION GAP SERPL CALC-SCNC: 11 MMOL/L (ref 7–16)
APPEARANCE UR: CLEAR
AST SERPL-CCNC: 20 U/L (ref 12–45)
BASOPHILS # BLD AUTO: 0.5 % (ref 0–1.8)
BASOPHILS # BLD: 0.06 K/UL (ref 0–0.12)
BILIRUB SERPL-MCNC: 0.5 MG/DL (ref 0.1–1.5)
BILIRUB UR QL STRIP.AUTO: NEGATIVE
BUN SERPL-MCNC: 16 MG/DL (ref 8–22)
CALCIUM SERPL-MCNC: 9.4 MG/DL (ref 8.5–10.5)
CHLORIDE SERPL-SCNC: 103 MMOL/L (ref 96–112)
CHOLEST SERPL-MCNC: 227 MG/DL (ref 100–199)
CO2 SERPL-SCNC: 24 MMOL/L (ref 20–33)
COLOR UR: YELLOW
CREAT SERPL-MCNC: 1.31 MG/DL (ref 0.5–1.4)
EOSINOPHIL # BLD AUTO: 0.2 K/UL (ref 0–0.51)
EOSINOPHIL NFR BLD: 1.8 % (ref 0–6.9)
ERYTHROCYTE [DISTWIDTH] IN BLOOD BY AUTOMATED COUNT: 46.2 FL (ref 35.9–50)
EST. AVERAGE GLUCOSE BLD GHB EST-MCNC: 103 MG/DL
GLOBULIN SER CALC-MCNC: 3.7 G/DL (ref 1.9–3.5)
GLUCOSE SERPL-MCNC: 78 MG/DL (ref 65–99)
GLUCOSE UR STRIP.AUTO-MCNC: NEGATIVE MG/DL
HBA1C MFR BLD: 5.2 % (ref 4–5.6)
HCT VFR BLD AUTO: 48.3 % (ref 42–52)
HDLC SERPL-MCNC: 45 MG/DL
HGB BLD-MCNC: 16.6 G/DL (ref 14–18)
IMM GRANULOCYTES # BLD AUTO: 0.07 K/UL (ref 0–0.11)
IMM GRANULOCYTES NFR BLD AUTO: 0.6 % (ref 0–0.9)
KETONES UR STRIP.AUTO-MCNC: NEGATIVE MG/DL
LDLC SERPL CALC-MCNC: 164 MG/DL
LEUKOCYTE ESTERASE UR QL STRIP.AUTO: NEGATIVE
LYMPHOCYTES # BLD AUTO: 4.57 K/UL (ref 1–4.8)
LYMPHOCYTES NFR BLD: 40.2 % (ref 22–41)
MCH RBC QN AUTO: 32.7 PG (ref 27–33)
MCHC RBC AUTO-ENTMCNC: 34.4 G/DL (ref 33.7–35.3)
MCV RBC AUTO: 95.3 FL (ref 81.4–97.8)
MICRO URNS: NORMAL
MONOCYTES # BLD AUTO: 0.86 K/UL (ref 0–0.85)
MONOCYTES NFR BLD AUTO: 7.6 % (ref 0–13.4)
NEUTROPHILS # BLD AUTO: 5.62 K/UL (ref 1.82–7.42)
NEUTROPHILS NFR BLD: 49.3 % (ref 44–72)
NITRITE UR QL STRIP.AUTO: NEGATIVE
NRBC # BLD AUTO: 0 K/UL
NRBC BLD-RTO: 0 /100 WBC
PH UR STRIP.AUTO: 6 [PH] (ref 5–8)
PLATELET # BLD AUTO: 209 K/UL (ref 164–446)
PMV BLD AUTO: 10.7 FL (ref 9–12.9)
POTASSIUM SERPL-SCNC: 3.6 MMOL/L (ref 3.6–5.5)
PROT SERPL-MCNC: 8.2 G/DL (ref 6–8.2)
PROT UR QL STRIP: NEGATIVE MG/DL
RBC # BLD AUTO: 5.07 M/UL (ref 4.7–6.1)
RBC UR QL AUTO: NEGATIVE
SODIUM SERPL-SCNC: 138 MMOL/L (ref 135–145)
SP GR UR STRIP.AUTO: 1.01
TRIGL SERPL-MCNC: 92 MG/DL (ref 0–149)
TSH SERPL DL<=0.005 MIU/L-ACNC: 1.53 UIU/ML (ref 0.38–5.33)
UROBILINOGEN UR STRIP.AUTO-MCNC: 0.2 MG/DL
WBC # BLD AUTO: 11.4 K/UL (ref 4.8–10.8)

## 2021-12-17 PROCEDURE — 84443 ASSAY THYROID STIM HORMONE: CPT

## 2021-12-17 PROCEDURE — 82306 VITAMIN D 25 HYDROXY: CPT

## 2021-12-17 PROCEDURE — 36415 COLL VENOUS BLD VENIPUNCTURE: CPT

## 2021-12-17 PROCEDURE — 80053 COMPREHEN METABOLIC PANEL: CPT

## 2021-12-17 PROCEDURE — 85025 COMPLETE CBC W/AUTO DIFF WBC: CPT

## 2021-12-17 PROCEDURE — 83036 HEMOGLOBIN GLYCOSYLATED A1C: CPT

## 2021-12-17 PROCEDURE — 80061 LIPID PANEL: CPT

## 2021-12-17 PROCEDURE — 81003 URINALYSIS AUTO W/O SCOPE: CPT

## 2021-12-19 ENCOUNTER — APPOINTMENT (OUTPATIENT)
Dept: RADIOLOGY | Facility: MEDICAL CENTER | Age: 51
End: 2021-12-19
Attending: INTERNAL MEDICINE
Payer: COMMERCIAL

## 2021-12-19 LAB — 25(OH)D3 SERPL-MCNC: 30 NG/ML (ref 30–80)

## 2021-12-20 ENCOUNTER — HOSPITAL ENCOUNTER (OUTPATIENT)
Dept: RADIOLOGY | Facility: MEDICAL CENTER | Age: 51
End: 2021-12-20
Attending: INTERNAL MEDICINE
Payer: COMMERCIAL

## 2021-12-20 ENCOUNTER — TELEPHONE (OUTPATIENT)
Dept: MEDICAL GROUP | Facility: MEDICAL CENTER | Age: 51
End: 2021-12-20

## 2021-12-20 DIAGNOSIS — N18.2 STAGE 2 CHRONIC KIDNEY DISEASE: ICD-10-CM

## 2021-12-20 DIAGNOSIS — K40.20 BILATERAL INGUINAL HERNIA WITHOUT OBSTRUCTION OR GANGRENE, RECURRENCE NOT SPECIFIED: ICD-10-CM

## 2021-12-20 DIAGNOSIS — R19.09 MASS OF LEFT INGUINAL REGION: ICD-10-CM

## 2021-12-20 PROCEDURE — 76775 US EXAM ABDO BACK WALL LIM: CPT

## 2021-12-20 PROCEDURE — 76857 US EXAM PELVIC LIMITED: CPT

## 2021-12-20 NOTE — TELEPHONE ENCOUNTER
12/20/2021 10:37 AM     HISTORY/REASON FOR EXAM:  left inguinal lump  Pain     TECHNIQUE/EXAM DESCRIPTION AND NUMBER OF VIEWS:  Bilateral inguinal ultrasound with and without Valsalva maneuver.     COMPARISON: None     FINDINGS:  There is a reducible bowel and fat-containing painful left inguinal hernia.  The hernia defect measures 3.0 cm.     There is a reducible nonpainful fat-containing right inguinal hernia.  The hernia defect measures 1.2 cm.        IMPRESSION:     1.  Reducible bowel and fat-containing painful left inguinal hernia.     2.  Reducible nonpainful fat-containing right inguinal hernia.      Bilateral inguinal hernia without obstruction or gangrene, recurrence not specified     Reducible bowel and fat-containing painful left inguinal hernia.      Reducible nonpainful fat-containing right inguinal hernia.    -     Referral to General Surgery

## 2021-12-21 DIAGNOSIS — N28.1 KIDNEY CYSTS: ICD-10-CM

## 2021-12-21 NOTE — PROGRESS NOTES
12/20/2021 10:37 AM     HISTORY/REASON FOR EXAM:  Abnormal Labs  Chronic kidney disease.     TECHNIQUE/EXAM DESCRIPTION:  Renal ultrasound.     COMPARISON:  None     FINDINGS:  The right kidney measures 10.86 cm.  There is a 3.6 x 4.3 x 3.5 cm cyst upper pole right kidney. Internal septations are demonstrated.  There is a 1.2 x 1.5 x 2.0 cm cyst mid to upper pole right kidney. Internal septations demonstrated.     The left kidney measures 10.95 cm.     There is no hydronephrosis.  There are no abnormal calcifications.     The bladder demonstrates no focal wall abnormality.  Ureteral jets within the urinary bladder are not observed.     Estimated prevoid urine volume within the urinary bladder 37 mL.  Estimated postvoid urine volume within the urinary bladder 15 mL.     Prostate measures 4.6 x 3.3 x 3.8 cm. Estimated volume 29.8 mL.     IMPRESSION:     1.  No hydronephrosis.     2.  4.3 cm and 2.0 cm mildly complicated right renal cysts.        Kidney cysts  -     Referral to Urology

## 2022-01-05 ENCOUNTER — TELEPHONE (OUTPATIENT)
Dept: MEDICAL GROUP | Facility: MEDICAL CENTER | Age: 52
End: 2022-01-05

## 2022-01-05 DIAGNOSIS — K40.20 BILATERAL INGUINAL HERNIA WITHOUT OBSTRUCTION OR GANGRENE, RECURRENCE NOT SPECIFIED: ICD-10-CM

## 2022-01-05 NOTE — PROGRESS NOTES
Bilateral inguinal hernia without obstruction or gangrene, recurrence not specified  -     Referral to General Surgery      12/20/2021 10:37 AM     HISTORY/REASON FOR EXAM:  left inguinal lump  Pain     TECHNIQUE/EXAM DESCRIPTION AND NUMBER OF VIEWS:  Bilateral inguinal ultrasound with and without Valsalva maneuver.     COMPARISON: None     FINDINGS:  There is a reducible bowel and fat-containing painful left inguinal hernia.  The hernia defect measures 3.0 cm.     There is a reducible nonpainful fat-containing right inguinal hernia.  The hernia defect measures 1.2 cm.        IMPRESSION:     1.  Reducible bowel and fat-containing painful left inguinal hernia.     2.  Reducible nonpainful fat-containing right inguinal hernia.

## 2022-01-05 NOTE — TELEPHONE ENCOUNTER
1. Name: Corey SILVERMAN Aleksandar      Call Back Number: [phone] (home)         How would the patient prefer to be contacted with a response: Phone call OK to leave a detailed message    Pt Corey ROBERTSON for Dr Kumar to call him he has questions in regards to a Surgery Procedure upcoming.     Thank you

## 2022-01-25 ENCOUNTER — PRE-ADMISSION TESTING (OUTPATIENT)
Dept: ADMISSIONS | Facility: MEDICAL CENTER | Age: 52
End: 2022-01-25
Attending: SURGERY
Payer: COMMERCIAL

## 2022-01-25 ASSESSMENT — FIBROSIS 4 INDEX: FIB4 SCORE: 0.98

## 2022-02-03 ENCOUNTER — APPOINTMENT (OUTPATIENT)
Dept: ADMISSIONS | Facility: MEDICAL CENTER | Age: 52
End: 2022-02-03
Attending: SURGERY
Payer: COMMERCIAL

## 2022-02-03 DIAGNOSIS — Z01.812 PRE-OPERATIVE LABORATORY EXAMINATION: ICD-10-CM

## 2022-02-03 PROCEDURE — C9803 HOPD COVID-19 SPEC COLLECT: HCPCS

## 2022-02-03 PROCEDURE — U0005 INFEC AGEN DETEC AMPLI PROBE: HCPCS

## 2022-02-03 PROCEDURE — U0003 INFECTIOUS AGENT DETECTION BY NUCLEIC ACID (DNA OR RNA); SEVERE ACUTE RESPIRATORY SYNDROME CORONAVIRUS 2 (SARS-COV-2) (CORONAVIRUS DISEASE [COVID-19]), AMPLIFIED PROBE TECHNIQUE, MAKING USE OF HIGH THROUGHPUT TECHNOLOGIES AS DESCRIBED BY CMS-2020-01-R: HCPCS

## 2022-02-04 ENCOUNTER — APPOINTMENT (OUTPATIENT)
Dept: ADMISSIONS | Facility: MEDICAL CENTER | Age: 52
End: 2022-02-04
Attending: SURGERY
Payer: COMMERCIAL

## 2022-02-04 LAB
SARS-COV-2 RNA RESP QL NAA+PROBE: DETECTED
SPECIMEN SOURCE: ABNORMAL

## 2022-02-06 ENCOUNTER — ANESTHESIA EVENT (OUTPATIENT)
Dept: SURGERY | Facility: MEDICAL CENTER | Age: 52
End: 2022-02-06
Payer: COMMERCIAL

## 2022-02-07 ENCOUNTER — ANESTHESIA (OUTPATIENT)
Dept: SURGERY | Facility: MEDICAL CENTER | Age: 52
End: 2022-02-07
Payer: COMMERCIAL

## 2022-02-10 NOTE — OR NURSING
Called pt after rescheduled to 2/14/22.  Pt had covid positive test on 2/3/2022 and pt reports he is asymptomatic.  Reviewed preop instructions/ NPO and pt states he still has his preop packet info.  Pt denies any further questions.  Pt advised to f/u with Dr. Ramachandran office to confirm date and check in time

## 2022-02-14 ENCOUNTER — HOSPITAL ENCOUNTER (OUTPATIENT)
Facility: MEDICAL CENTER | Age: 52
End: 2022-02-14
Attending: SURGERY | Admitting: SURGERY
Payer: COMMERCIAL

## 2022-02-14 VITALS
OXYGEN SATURATION: 98 % | HEART RATE: 66 BPM | TEMPERATURE: 97.9 F | WEIGHT: 182.76 LBS | DIASTOLIC BLOOD PRESSURE: 85 MMHG | HEIGHT: 72 IN | SYSTOLIC BLOOD PRESSURE: 137 MMHG | BODY MASS INDEX: 24.75 KG/M2 | RESPIRATION RATE: 21 BRPM

## 2022-02-14 DIAGNOSIS — G89.18 POSTOPERATIVE PAIN: ICD-10-CM

## 2022-02-14 PROCEDURE — 160002 HCHG RECOVERY MINUTES (STAT): Performed by: SURGERY

## 2022-02-14 PROCEDURE — 160025 RECOVERY II MINUTES (STATS): Performed by: SURGERY

## 2022-02-14 PROCEDURE — 160041 HCHG SURGERY MINUTES - EA ADDL 1 MIN LEVEL 4: Performed by: SURGERY

## 2022-02-14 PROCEDURE — 700101 HCHG RX REV CODE 250: Performed by: INTERNAL MEDICINE

## 2022-02-14 PROCEDURE — 700111 HCHG RX REV CODE 636 W/ 250 OVERRIDE (IP): Performed by: INTERNAL MEDICINE

## 2022-02-14 PROCEDURE — 502714 HCHG ROBOTIC SURGERY SERVICES: Performed by: SURGERY

## 2022-02-14 PROCEDURE — C1781 MESH (IMPLANTABLE): HCPCS | Performed by: SURGERY

## 2022-02-14 PROCEDURE — 500002 HCHG ADHESIVE, DERMABOND: Performed by: SURGERY

## 2022-02-14 PROCEDURE — 700105 HCHG RX REV CODE 258: Performed by: SURGERY

## 2022-02-14 PROCEDURE — 700101 HCHG RX REV CODE 250: Performed by: SURGERY

## 2022-02-14 PROCEDURE — 160009 HCHG ANES TIME/MIN: Performed by: SURGERY

## 2022-02-14 PROCEDURE — 160036 HCHG PACU - EA ADDL 30 MINS PHASE I: Performed by: SURGERY

## 2022-02-14 PROCEDURE — 160048 HCHG OR STATISTICAL LEVEL 1-5: Performed by: SURGERY

## 2022-02-14 PROCEDURE — 160029 HCHG SURGERY MINUTES - 1ST 30 MINS LEVEL 4: Performed by: SURGERY

## 2022-02-14 PROCEDURE — 160046 HCHG PACU - 1ST 60 MINS PHASE II: Performed by: SURGERY

## 2022-02-14 PROCEDURE — A9270 NON-COVERED ITEM OR SERVICE: HCPCS | Performed by: INTERNAL MEDICINE

## 2022-02-14 PROCEDURE — 700102 HCHG RX REV CODE 250 W/ 637 OVERRIDE(OP): Performed by: INTERNAL MEDICINE

## 2022-02-14 PROCEDURE — 160035 HCHG PACU - 1ST 60 MINS PHASE I: Performed by: SURGERY

## 2022-02-14 PROCEDURE — 501570 HCHG TROCAR, SEPARATOR: Performed by: SURGERY

## 2022-02-14 PROCEDURE — 501838 HCHG SUTURE GENERAL: Performed by: SURGERY

## 2022-02-14 DEVICE — MESH PROGRIP LAPROSCOPIC SELF FIXATING (1/CA): Type: IMPLANTABLE DEVICE | Site: GROIN | Status: FUNCTIONAL

## 2022-02-14 RX ORDER — POLYETHYLENE GLYCOL 3350 17 G/17G
17 POWDER, FOR SOLUTION ORAL DAILY
Qty: 20 EACH | Refills: 0 | Status: SHIPPED | OUTPATIENT
Start: 2022-02-14

## 2022-02-14 RX ORDER — KETOROLAC TROMETHAMINE 30 MG/ML
INJECTION, SOLUTION INTRAMUSCULAR; INTRAVENOUS PRN
Status: DISCONTINUED | OUTPATIENT
Start: 2022-02-14 | End: 2022-02-14 | Stop reason: SURG

## 2022-02-14 RX ORDER — LABETALOL HYDROCHLORIDE 5 MG/ML
5 INJECTION, SOLUTION INTRAVENOUS
Status: DISCONTINUED | OUTPATIENT
Start: 2022-02-14 | End: 2022-02-14 | Stop reason: HOSPADM

## 2022-02-14 RX ORDER — ACETAMINOPHEN 325 MG/1
650 TABLET ORAL EVERY 6 HOURS
Qty: 60 TABLET | Refills: 0 | Status: SHIPPED | OUTPATIENT
Start: 2022-02-14

## 2022-02-14 RX ORDER — BUPIVACAINE HYDROCHLORIDE AND EPINEPHRINE 5; 5 MG/ML; UG/ML
INJECTION, SOLUTION PERINEURAL
Status: DISCONTINUED | OUTPATIENT
Start: 2022-02-14 | End: 2022-02-14 | Stop reason: HOSPADM

## 2022-02-14 RX ORDER — HYDROMORPHONE HYDROCHLORIDE 1 MG/ML
0.4 INJECTION, SOLUTION INTRAMUSCULAR; INTRAVENOUS; SUBCUTANEOUS
Status: DISCONTINUED | OUTPATIENT
Start: 2022-02-14 | End: 2022-02-14 | Stop reason: HOSPADM

## 2022-02-14 RX ORDER — OXYCODONE HYDROCHLORIDE 5 MG/1
5 TABLET ORAL EVERY 4 HOURS PRN
Qty: 12 TABLET | Refills: 0 | Status: SHIPPED | OUTPATIENT
Start: 2022-02-14 | End: 2022-02-17

## 2022-02-14 RX ORDER — SODIUM CHLORIDE, SODIUM LACTATE, POTASSIUM CHLORIDE, CALCIUM CHLORIDE 600; 310; 30; 20 MG/100ML; MG/100ML; MG/100ML; MG/100ML
INJECTION, SOLUTION INTRAVENOUS CONTINUOUS
Status: DISCONTINUED | OUTPATIENT
Start: 2022-02-14 | End: 2022-02-14 | Stop reason: HOSPADM

## 2022-02-14 RX ORDER — CELECOXIB 200 MG/1
200 CAPSULE ORAL ONCE
Status: COMPLETED | OUTPATIENT
Start: 2022-02-14 | End: 2022-02-14

## 2022-02-14 RX ORDER — LIDOCAINE HYDROCHLORIDE 20 MG/ML
INJECTION, SOLUTION EPIDURAL; INFILTRATION; INTRACAUDAL; PERINEURAL PRN
Status: DISCONTINUED | OUTPATIENT
Start: 2022-02-14 | End: 2022-02-14 | Stop reason: SURG

## 2022-02-14 RX ORDER — OXYCODONE HCL 5 MG/5 ML
5 SOLUTION, ORAL ORAL
Status: COMPLETED | OUTPATIENT
Start: 2022-02-14 | End: 2022-02-14

## 2022-02-14 RX ORDER — ROCURONIUM BROMIDE 10 MG/ML
INJECTION, SOLUTION INTRAVENOUS PRN
Status: DISCONTINUED | OUTPATIENT
Start: 2022-02-14 | End: 2022-02-14 | Stop reason: SURG

## 2022-02-14 RX ORDER — HYDRALAZINE HYDROCHLORIDE 20 MG/ML
5 INJECTION INTRAMUSCULAR; INTRAVENOUS
Status: DISCONTINUED | OUTPATIENT
Start: 2022-02-14 | End: 2022-02-14 | Stop reason: HOSPADM

## 2022-02-14 RX ORDER — HALOPERIDOL 5 MG/ML
1 INJECTION INTRAMUSCULAR
Status: DISCONTINUED | OUTPATIENT
Start: 2022-02-14 | End: 2022-02-14 | Stop reason: HOSPADM

## 2022-02-14 RX ORDER — ACETAMINOPHEN 500 MG
1000 TABLET ORAL ONCE
Status: COMPLETED | OUTPATIENT
Start: 2022-02-14 | End: 2022-02-14

## 2022-02-14 RX ORDER — MEPERIDINE HYDROCHLORIDE 25 MG/ML
12.5 INJECTION INTRAMUSCULAR; INTRAVENOUS; SUBCUTANEOUS
Status: DISCONTINUED | OUTPATIENT
Start: 2022-02-14 | End: 2022-02-14 | Stop reason: HOSPADM

## 2022-02-14 RX ORDER — HYDROMORPHONE HYDROCHLORIDE 1 MG/ML
0.2 INJECTION, SOLUTION INTRAMUSCULAR; INTRAVENOUS; SUBCUTANEOUS
Status: DISCONTINUED | OUTPATIENT
Start: 2022-02-14 | End: 2022-02-14 | Stop reason: HOSPADM

## 2022-02-14 RX ORDER — ONDANSETRON 2 MG/ML
INJECTION INTRAMUSCULAR; INTRAVENOUS PRN
Status: DISCONTINUED | OUTPATIENT
Start: 2022-02-14 | End: 2022-02-14 | Stop reason: SURG

## 2022-02-14 RX ORDER — MIDAZOLAM HYDROCHLORIDE 1 MG/ML
INJECTION INTRAMUSCULAR; INTRAVENOUS PRN
Status: DISCONTINUED | OUTPATIENT
Start: 2022-02-14 | End: 2022-02-14 | Stop reason: SURG

## 2022-02-14 RX ORDER — IBUPROFEN 600 MG/1
600 TABLET ORAL EVERY 6 HOURS
Qty: 45 TABLET | Refills: 0 | Status: SHIPPED | OUTPATIENT
Start: 2022-02-14

## 2022-02-14 RX ORDER — ONDANSETRON 2 MG/ML
4 INJECTION INTRAMUSCULAR; INTRAVENOUS
Status: DISCONTINUED | OUTPATIENT
Start: 2022-02-14 | End: 2022-02-14 | Stop reason: HOSPADM

## 2022-02-14 RX ORDER — DEXAMETHASONE SODIUM PHOSPHATE 4 MG/ML
INJECTION, SOLUTION INTRA-ARTICULAR; INTRALESIONAL; INTRAMUSCULAR; INTRAVENOUS; SOFT TISSUE PRN
Status: DISCONTINUED | OUTPATIENT
Start: 2022-02-14 | End: 2022-02-14 | Stop reason: SURG

## 2022-02-14 RX ORDER — CEFAZOLIN SODIUM 1 G/3ML
INJECTION, POWDER, FOR SOLUTION INTRAMUSCULAR; INTRAVENOUS PRN
Status: DISCONTINUED | OUTPATIENT
Start: 2022-02-14 | End: 2022-02-14 | Stop reason: SURG

## 2022-02-14 RX ORDER — LIDOCAINE HYDROCHLORIDE 40 MG/ML
SOLUTION TOPICAL PRN
Status: DISCONTINUED | OUTPATIENT
Start: 2022-02-14 | End: 2022-02-14 | Stop reason: SURG

## 2022-02-14 RX ORDER — METOPROLOL TARTRATE 1 MG/ML
INJECTION, SOLUTION INTRAVENOUS PRN
Status: DISCONTINUED | OUTPATIENT
Start: 2022-02-14 | End: 2022-02-14 | Stop reason: SURG

## 2022-02-14 RX ORDER — HYDROMORPHONE HYDROCHLORIDE 1 MG/ML
0.1 INJECTION, SOLUTION INTRAMUSCULAR; INTRAVENOUS; SUBCUTANEOUS
Status: DISCONTINUED | OUTPATIENT
Start: 2022-02-14 | End: 2022-02-14 | Stop reason: HOSPADM

## 2022-02-14 RX ORDER — DIPHENHYDRAMINE HYDROCHLORIDE 50 MG/ML
12.5 INJECTION INTRAMUSCULAR; INTRAVENOUS
Status: DISCONTINUED | OUTPATIENT
Start: 2022-02-14 | End: 2022-02-14 | Stop reason: HOSPADM

## 2022-02-14 RX ORDER — OXYCODONE HCL 5 MG/5 ML
10 SOLUTION, ORAL ORAL
Status: COMPLETED | OUTPATIENT
Start: 2022-02-14 | End: 2022-02-14

## 2022-02-14 RX ORDER — DIPHENHYDRAMINE HCL 25 MG
25 TABLET ORAL EVERY 6 HOURS PRN
Status: DISCONTINUED | OUTPATIENT
Start: 2022-02-14 | End: 2022-02-14 | Stop reason: HOSPADM

## 2022-02-14 RX ORDER — HYDROMORPHONE HYDROCHLORIDE 2 MG/ML
INJECTION, SOLUTION INTRAMUSCULAR; INTRAVENOUS; SUBCUTANEOUS PRN
Status: DISCONTINUED | OUTPATIENT
Start: 2022-02-14 | End: 2022-02-14 | Stop reason: SURG

## 2022-02-14 RX ORDER — DIPHENHYDRAMINE HYDROCHLORIDE 50 MG/ML
25 INJECTION INTRAMUSCULAR; INTRAVENOUS EVERY 6 HOURS PRN
Status: DISCONTINUED | OUTPATIENT
Start: 2022-02-14 | End: 2022-02-14 | Stop reason: HOSPADM

## 2022-02-14 RX ADMIN — FENTANYL CITRATE 50 MCG: 50 INJECTION, SOLUTION INTRAMUSCULAR; INTRAVENOUS at 09:16

## 2022-02-14 RX ADMIN — PROPOFOL 200 MG: 10 INJECTION, EMULSION INTRAVENOUS at 08:50

## 2022-02-14 RX ADMIN — ROCURONIUM BROMIDE 50 MG: 10 INJECTION, SOLUTION INTRAVENOUS at 08:50

## 2022-02-14 RX ADMIN — CEFAZOLIN 2 G: 330 INJECTION, POWDER, FOR SOLUTION INTRAMUSCULAR; INTRAVENOUS at 08:58

## 2022-02-14 RX ADMIN — PROPOFOL 100 MG: 10 INJECTION, EMULSION INTRAVENOUS at 08:52

## 2022-02-14 RX ADMIN — LIDOCAINE HYDROCHLORIDE 4 ML: 40 SOLUTION TOPICAL at 08:53

## 2022-02-14 RX ADMIN — MIDAZOLAM HYDROCHLORIDE 2 MG: 1 INJECTION, SOLUTION INTRAMUSCULAR; INTRAVENOUS at 08:43

## 2022-02-14 RX ADMIN — CELECOXIB 200 MG: 200 CAPSULE ORAL at 08:10

## 2022-02-14 RX ADMIN — ONDANSETRON 4 MG: 2 INJECTION INTRAMUSCULAR; INTRAVENOUS at 09:39

## 2022-02-14 RX ADMIN — HYDROMORPHONE HYDROCHLORIDE 0.5 MG: 2 INJECTION INTRAMUSCULAR; INTRAVENOUS; SUBCUTANEOUS at 08:55

## 2022-02-14 RX ADMIN — LIDOCAINE HYDROCHLORIDE 100 MG: 20 INJECTION, SOLUTION EPIDURAL; INFILTRATION; INTRACAUDAL; PERINEURAL at 08:50

## 2022-02-14 RX ADMIN — METOPROLOL TARTRATE 5 MG: 5 INJECTION INTRAVENOUS at 09:04

## 2022-02-14 RX ADMIN — ACETAMINOPHEN 1000 MG: 500 TABLET, FILM COATED ORAL at 08:10

## 2022-02-14 RX ADMIN — SODIUM CHLORIDE, POTASSIUM CHLORIDE, SODIUM LACTATE AND CALCIUM CHLORIDE: 600; 310; 30; 20 INJECTION, SOLUTION INTRAVENOUS at 08:11

## 2022-02-14 RX ADMIN — HYDROMORPHONE HYDROCHLORIDE 0.5 MG: 2 INJECTION INTRAMUSCULAR; INTRAVENOUS; SUBCUTANEOUS at 09:44

## 2022-02-14 RX ADMIN — FENTANYL CITRATE 100 MCG: 50 INJECTION, SOLUTION INTRAMUSCULAR; INTRAVENOUS at 08:48

## 2022-02-14 RX ADMIN — KETOROLAC TROMETHAMINE 30 MG: 30 INJECTION, SOLUTION INTRAMUSCULAR at 09:41

## 2022-02-14 RX ADMIN — OXYCODONE HYDROCHLORIDE 10 MG: 5 SOLUTION ORAL at 11:43

## 2022-02-14 RX ADMIN — DEXAMETHASONE SODIUM PHOSPHATE 8 MG: 4 INJECTION, SOLUTION INTRAMUSCULAR; INTRAVENOUS at 08:50

## 2022-02-14 RX ADMIN — LIDOCAINE HYDROCHLORIDE 0.5 ML: 10 INJECTION, SOLUTION EPIDURAL; INFILTRATION; INTRACAUDAL; PERINEURAL at 08:10

## 2022-02-14 RX ADMIN — FENTANYL CITRATE 50 MCG: 50 INJECTION, SOLUTION INTRAMUSCULAR; INTRAVENOUS at 09:39

## 2022-02-14 ASSESSMENT — PAIN DESCRIPTION - PAIN TYPE
TYPE: SURGICAL PAIN

## 2022-02-14 ASSESSMENT — FIBROSIS 4 INDEX: FIB4 SCORE: 0.98

## 2022-02-14 NOTE — OP REPORT
Operative Report    Date: 2/14/2022    Surgeon: Man Hamm M.D.     Assistant: DEIRDRE Tanner    Pre-operative Diagnosis: bilateral Inguinal Hernia    Post-operative Diagnosis: Same     Procedure: Robotic bilateral Inguinal Hernia Repair with Mesh    ASA Classification: II.    Indications: This is a 51 y.o. male who presented with symptoms of bilateral Inguinal Hernia. Here for repair    The indications for a surgical assistant in this surgery were indicated due to complexity of the procedure. Their role included aiding in incision, retraction, holding devices including camera for laparoscopic procedure, and closure of the wound.      Findings: bilateral direct inguinal hernias    During intraoperative examination of the pelvic floor a bilateral inguinal hernia was noted.  Given our robotic approach concurrent repair would be of a benefit to the patient and the intraoperative decision was made to repair a bilateral inguinal hernia in the same anesthetic setting.  Preoperatively an extensive conversation was had with the patient about this possibility and they understood the possibility and wished to proceed.    Wound Classification: Class I, I, Clean..    Procedure in detail: The patient was seen and examined in the preoperative holding area.  The risks benefits and alternatives of the procedure were discussed with the patient who wished to proceed with the procedure as described.  The patient was transferred to the operating room placed in supine position and all pressure points were properly padded.  General endotracheal anesthesia was induced and preoperative antibiotics were given per SCIP protocol.  Patient's abdomen was prepped with ChloraPrep and draped in the normal sterile fashion.  A timeout was performed confirming correct patient, correct procedure, and that all necessary equipment was in the room.      A umbilical hernia was identified at the beginning of the case.  We infused local  anesthetic over the supraumbilical area and use a combination of blunt electrodissection to free the umbilical stalk from the umbilical hernia.  A 0 Ethibond suture was placed around the fascial opening and a figure-of-eight manner and tied at the end of the case to close the hernia, after closing the hernia at the end of the case the umbilical stalk was secured to the fascia using a 2-0 vicryl.  We then used the hernia defect to gain access to the abdomen with a robotic port.  Pneumoperitoneum was then achieved and maintained at 15 mmHg carbon dioxide throughout the entirety of the case.  Under direct visualization a right and left working port replaced with a 8 mm robotic port after infusing local anesthetic. the robot was then docked.  We began by identifying an area approximately 8 cm from the inguinal hernia and the peritoneum was sharply incised.  Using combination of blunt, electrocautery, and sharp dissection we were able to dissect the peritoneal flap down to the inguinal canal.  Careful dissection was completed medially to identify the pubic symphysis and carried this down to the obturator canal.  The corona mortise was identified and preserved.  We then continued dissection laterally until we encountered the psoas muscle ensuring that we maintain the iliopubic tract against the abdominal wall. A direct hernia sac was identified. Careful dissection was used to free the indirect inguinal hernia sac ensuring that the gonadal structures were carefully identified and preserved throughout the dissection of the hernia sac.  We carried our dissection down until the peritoneum was well below our area of mesh placement. Any identified cord lipoma was carefully dissected free and reduced into the preperitoneal space..     The peritoneal flap was then extended to the other side and the dissection was completed in a similar fashion. Two 15 x 10 pro- hernia meshes were selected and introduced.  These were then laid  into the dissected peritoneal flap ensuring good coverage medially down the pubic symphysis over the midline covering the  space as well as good overlap over the inguinal canal anatomy on both sides.  The peritoneal flap was manipulated to ensure that the mesh did not move.    The peritoneal flap was then closed with a 2-0 strata fix suture.  The perineal flap was then carefully inspected and any identified rents were closed carefully using the same 2-0 strata fix suture.  The robot was then undocked and the ports were carefully removed.  Skin was then closed with 4-0 Monocryl in a subcuticular fashion and Dermabond was placed over the wounds.    The patient was awakened from general anesthetic, and was taken to the recovery room in stable condition.    Sponge and needle counts were correct at the end of the case.     Specimen: none    EBL: 20mL    Dispo: stable, extubated, to PACU    Man Hamm M.D.  [facility]  [phone]

## 2022-02-14 NOTE — OR NURSING
1000 report to sanjeev HOWELL    1020 report from Sanjeev HOWELL agree with assessment patient still foggy easy to reorient but continues to ask the same questions over and over.     1030 tolerating clears has some pain but no nausea     1045 doing well needs to be in PACU for 60 min due to undiagnosed STOPBANG.    1100 tolerating clears     1115 meets critieria to move to phase 2  Wife notified she is in the waiting room.     1120 report to Candice HOWELL   1124 to phase 2

## 2022-02-14 NOTE — ANESTHESIA POSTPROCEDURE EVALUATION
Patient: Corey Huertas    Procedure Summary     Date: 02/14/22 Room / Location:  OR  / SURGERY [facility] [place]    Anesthesia Start: 0843 Anesthesia Stop: 1001    Procedure: REPAIR, HERNIA, INGUINAL, ROBOT-ASSISTED, USING DA RENEE XI (Bilateral Groin) Diagnosis:       Bilateral inguinal hernia      (BILATERAL INGUINAL HERNIA)    Surgeons: Man Hamm M.D. Responsible Provider: Ceasar Way M.D.    Anesthesia Type: general ASA Status: 2          Final Anesthesia Type: general  Last vitals  BP   Blood Pressure: 142/94    Temp   37 °C (98.6 °F)    Pulse   85   Resp   18    SpO2   98 %      Anesthesia Post Evaluation    Patient location during evaluation: PACU  Patient participation: complete - patient participated  Level of consciousness: awake and alert    Airway patency: patent  Anesthetic complications: no  Cardiovascular status: hemodynamically stable  Respiratory status: acceptable  Hydration status: euvolemic    PONV: none          No complications documented.     Nurse Pain Score: 2 (NPRS)

## 2022-02-14 NOTE — ANESTHESIA PREPROCEDURE EVALUATION
Case: [id number] Date/Time: 02/14/22 0845    Procedure: REPAIR, HERNIA, INGUINAL, ROBOT-ASSISTED, USING DA RENEE XI (Bilateral Groin)    Anesthesia type: General    Diagnosis: Bilateral inguinal hernia [K40.20]    Pre-op diagnosis: BILATERAL INGUINAL HERNIA    Location: [facility] OR 01 / SURGERY [facility]    Surgeons: Man Hamm M.D.          Relevant Problems      (positive) Stage 2 chronic kidney disease      Other   (positive) Elevated serum creatinine   (positive) Mass of left inguinal region   (positive) Tobacco use       Physical Exam    Airway   Mallampati: II  TM distance: >3 FB  Neck ROM: full       Cardiovascular - normal exam  Rhythm: regular  Rate: normal  (-) murmur     Dental - normal exam      Very poor dentition   Pulmonary - normal exam  Breath sounds clear to auscultation     Abdominal    Neurological - normal exam               Anesthesia Plan    ASA 2       Plan - general       Airway plan will be ETT        Plan Factors:   Patient was previously instructed to abstain from smoking on day of procedure.  Patient did not smoke on day of procedure.      Induction: intravenous    Postoperative Plan: Postoperative administration of opioids is intended.    Pertinent diagnostic labs and testing reviewed    Informed Consent:    Anesthetic plan and risks discussed with patient.    Use of blood products discussed with: patient whom consented to blood products.

## 2022-02-14 NOTE — OR NURSING
1124 Patient dressing  1140  Patient rates pain at 8/10   Administered oxycodone   1150 reviewed discharge instructions with patient wife and patient verbalized understanding.   1200 patient discharged.

## 2022-02-14 NOTE — DISCHARGE INSTRUCTIONS
Inguinal Discharge Instructions:    ACTIVITIES: Upon discharge from the hospital, the day of surgery it is requested that you do no significant physical activity and limit mental activities, as you have had sedation. The day after surgery, you may resume activities of daily living, but for four weeks, it is recommended that you do no strenuous activities or heavy lifting (greater than 15 pounds).     DRIVING: You may drive whenever you are off pain medications and are able to perform the activities needed to drive, i.e. turning, bending, twisting, etc.     WOUND: It is not unusual for patients to experience swelling and even bruising at the hernia repair site. With inguinal hernias, sometimes the bruising and swelling may extend on to the penis or into the scrotum of male patients. This will resolve over the next few days.     ICE: please use ice on the wound to decrease the swelling for the first 24 hours and then discontinue.     BATHING: The dressing can be removed 48 hours after surgery and the wound can then be wetted in a shower as normal, but avoid submersion in water (tub bath) for at least 2 weeks.    PAIN MEDICATION: You will be given a prescription for pain medication at discharge. Please take these as directed. It is important to remember not to take medications on an empty stomach as this may cause nausea.     BOWEL FUNCTION: After hernia repair, it is not uncommon for patients to experience constipation. This is due to decreasing activity levels as well as pain medications. You may wish to use a stool softener beginning immediately after surgery, and you may or may not need to use a laxative (Milk of Magnesia, Ex-lax; Senokot, etc.) as well.            CALL IF YOU HAVE: Drainage or fluid from incision that may be foul smelling,  increased tenderness or soreness at the wound or the wound edges are no  longer together,redness or swelling at the incision site. Please do not hesitate to  call with any  other questions.     APPOINTMENT: Contact our office at [phone] for a follow-up appointment in 1 week following your procedure.     If you have any additional questions, please do not hesitate to call the office.         ACTIVITY: Rest and take it easy for the first 24 hours.  A responsible adult is recommended to remain with you during that time.  It is normal to feel sleepy.  We encourage you to not do anything that requires balance, judgment or coordination.    MILD FLU-LIKE SYMPTOMS ARE NORMAL. YOU MAY EXPERIENCE GENERALIZED MUSCLE ACHES, THROAT IRRITATION, HEADACHE AND/OR SOME NAUSEA.    FOR 24 HOURS DO NOT:  Drive, operate machinery or run household appliances.  Drink beer or alcoholic beverages.   Make important decisions or sign legal documents.      DIET: To avoid nausea, slowly advance diet as tolerated, avoiding spicy or greasy foods for the first day.  Add more substantial food to your diet according to your physician's instructions.  Babies can be fed formula or breast milk as soon as they are hungry.  INCREASE FLUIDS AND FIBER TO AVOID CONSTIPATION.        FOLLOW-UP APPOINTMENT:  A follow-up appointment should be arranged with your doctor; call to schedule.    You should CALL YOUR PHYSICIAN if you develop:  Fever greater than 101 degrees F.  Pain not relieved by medication, or persistent nausea or vomiting.  Excessive bleeding (blood soaking through dressing) or unexpected drainage from the wound.  Extreme redness or swelling around the incision site, drainage of pus or foul smelling drainage.  Inability to urinate or empty your bladder within 8 hours.  Problems with breathing or chest pain.    You should call 911 if you develop problems with breathing or chest pain.  If you are unable to contact your doctor or surgical center, you should go to the nearest emergency room or urgent care center.  Physician's telephone #: [phone]  If any questions arise, call your doctor.  If your doctor is not  available, please feel free to call the Surgical Center at [phone].     A registered nurse may call you a few days after your surgery to see how you are doing after your procedure.    MEDICATIONS: Resume taking daily medication.  Take prescribed pain medication with food.  If no medication is prescribed, you may take non-aspirin pain medication if needed.  PAIN MEDICATION CAN BE VERY CONSTIPATING.  Take a stool softener or laxative such as senokot, pericolace, or milk of magnesia if needed.    Prescription given.  Last pain medication given at____________________________  If your physician has prescribed pain medication that includes Acetaminophen (Tylenol), do not take additional Acetaminophen (Tylenol) while taking the prescribed medication.    Depression / Suicide Risk    As you are discharged from this [facility] facility, it is important to learn how to keep safe from harming yourself.    Recognize the warning signs:  · Abrupt changes in personality, positive or negative- including increase in energy   · Giving away possessions  · Change in eating patterns- significant weight changes-  positive or negative  · Change in sleeping patterns- unable to sleep or sleeping all the time   · Unwillingness or inability to communicate  · Depression  · Unusual sadness, discouragement and loneliness  · Talk of wanting to die  · Neglect of personal appearance   · Rebelliousness- reckless behavior  · Withdrawal from people/activities they love  · Confusion- inability to concentrate     If you or a loved one observes any of these behaviors or has concerns about self-harm, here's what you can do:  · Talk about it- your feelings and reasons for harming yourself  · Remove any means that you might use to hurt yourself (examples: pills, rope, extension cords, firearm)  · Get professional help from the community (Mental Health, Substance Abuse, psychological counseling)  · Do not be alone:Call your Safe Contact- someone  whom you trust who will be there for you.  · Call your local CRISIS HOTLINE 677-1008 or 778-287-3814  · Call your local Children's Mobile Crisis Response Team [place] (623) 487-5642 or www.Screenz  · Call the toll free National Suicide Prevention Hotlines   · National Suicide Prevention Lifeline 044-185-FYYX (5914)  · [employer] Social Line Network 800-SUICIDE (197-0450)

## 2022-02-14 NOTE — ANESTHESIA PROCEDURE NOTES
Airway    Date/Time: 2/14/2022 8:53 AM  Performed by: Ceasar Way M.D.  Authorized by: Ceasar Way M.D.     Location:  OR  Urgency:  Elective  Indications for Airway Management:  Anesthesia      Spontaneous Ventilation: absent    Sedation Level:  Deep  Preoxygenated: Yes    Patient Position:  Sniffing  Mask Difficulty Assessment:  2 - vent by mask + OA or adjuvant +/- NMBA  Final Airway Type:  Endotracheal airway  Final Endotracheal Airway:  ETT  Cuffed: Yes    Technique Used for Successful ETT Placement:  Video laryngoscopy    Insertion Site:  Oral  Blade Type:  Glide  Laryngoscope Blade/Videolaryngoscope Blade Size:  4  ETT Size (mm):  7.0  Measured from:  Teeth  ETT to Teeth (cm):  23  Placement Verified by: auscultation and capnometry    Cormack-Lehane Classification:  Grade IIa - partial view of glottis  Number of Attempts at Approach:  1  Number of Other Approaches Attempted:  1  Unsuccessful Approach(es) for ETT:  Direct laryngoscopy   Mac 4, grade 3 view, limited neck extension, decision made to use glidescope.

## 2022-02-14 NOTE — ANESTHESIA TIME REPORT
Anesthesia Start and Stop Event Times     Date Time Event    2/14/2022 0833 Ready for Procedure     0843 Anesthesia Start     1001 Anesthesia Stop        Responsible Staff  02/14/22    Name Role Begin End    Ceasar Way M.D. Anesth 0843 1001        Preop Diagnosis (Free Text):  Pre-op Diagnosis     BILATERAL INGUINAL HERNIA        Preop Diagnosis (Codes):  Diagnosis Information     Diagnosis Code(s): Bilateral inguinal hernia [K40.20]        Premium Reason  Non-Premium    Comments:

## 2022-02-14 NOTE — OR NURSING
0957 Pt arrive in PACU from the OR. Oral airway in place. On 8L of O2 via mask. Dermabond to 3 sites. CDI.   0958 Oral air way dc'd    1015 Pt awake.Denies any pain or nausea at this time.     1019 Report given to Em HOWELL.

## 2022-04-18 NOTE — ASSESSMENT & PLAN NOTE
Instructed to call immediately if any new distortion, blurring, decreased vision or eye pain. Lifestyle modifications

## 2022-05-24 ENCOUNTER — APPOINTMENT (OUTPATIENT)
Dept: MEDICAL GROUP | Facility: MEDICAL CENTER | Age: 52
End: 2022-05-24
Payer: COMMERCIAL

## 2022-11-22 ENCOUNTER — TELEPHONE (OUTPATIENT)
Dept: MEDICAL GROUP | Facility: MEDICAL CENTER | Age: 52
End: 2022-11-22
Payer: COMMERCIAL

## 2023-08-15 ENCOUNTER — OFFICE VISIT (OUTPATIENT)
Dept: SPORTS MEDICINE | Facility: CLINIC | Age: 53
End: 2023-08-15
Payer: COMMERCIAL

## 2023-08-15 ENCOUNTER — APPOINTMENT (OUTPATIENT)
Dept: RADIOLOGY | Facility: IMAGING CENTER | Age: 53
End: 2023-08-15
Attending: FAMILY MEDICINE
Payer: COMMERCIAL

## 2023-08-15 VITALS
WEIGHT: 182.76 LBS | HEIGHT: 71 IN | DIASTOLIC BLOOD PRESSURE: 78 MMHG | TEMPERATURE: 98.1 F | RESPIRATION RATE: 14 BRPM | BODY MASS INDEX: 25.59 KG/M2 | SYSTOLIC BLOOD PRESSURE: 120 MMHG | OXYGEN SATURATION: 100 % | HEART RATE: 59 BPM

## 2023-08-15 DIAGNOSIS — M54.12 RIGHT CERVICAL RADICULOPATHY: ICD-10-CM

## 2023-08-15 DIAGNOSIS — M54.2 CERVICALGIA: ICD-10-CM

## 2023-08-15 PROCEDURE — 3074F SYST BP LT 130 MM HG: CPT | Performed by: FAMILY MEDICINE

## 2023-08-15 PROCEDURE — 72040 X-RAY EXAM NECK SPINE 2-3 VW: CPT | Mod: TC | Performed by: FAMILY MEDICINE

## 2023-08-15 PROCEDURE — 99214 OFFICE O/P EST MOD 30 MIN: CPT | Performed by: FAMILY MEDICINE

## 2023-08-15 PROCEDURE — 3078F DIAST BP <80 MM HG: CPT | Performed by: FAMILY MEDICINE

## 2023-08-15 ASSESSMENT — FIBROSIS 4 INDEX: FIB4 SCORE: 1.02

## 2023-08-15 NOTE — PROGRESS NOTES
Chief Complaint   Patient presents with    Shoulder Pain     R shoulder pain      CHIEF COMPLAINT:  Corey Huertas male is self-referred for evaluation of Shoulder pain.     Corey Huertas is complaining of right shoulder pain  present for 6 months  Pain is at the renato-scapular  Quality is aching,  sore miuscle and tightness  Pain is Non-radiating as far as the pain, but he has had some tingling in the arm as well  Aggravated by  constant  Improved with  rest and ice and massage    no prior problems with this shoulder in the past, but he has had BILATERAL radicular symptoms in the past  Prior Treatments:  chiropractic care  Prior studies: NO Prior imaging has been done   Medications tried for pain include: ibuprofen (OTC)  Mechanical Symptom history: No Locking and Popping which is not necessarily painful    Works as a , industrial  Likes lifting weights, kickboxing and heavy grappling    REVIEW OF SYSTEMS  No Nausea, No Vomiting, No Chest Pain, No Shortness of Breath, No Dizziness, No Headache    PAST MEDICAL HISTORY:   History reviewed. No pertinent past medical history.    PMH:  has a past medical history of Allergy, Bronchitis, COVID-19 (02/03/2022), High cholesterol, Pneumonia (02/2020), and Renal disorder.  MEDS:   Current Outpatient Medications:     acetaminophen (TYLENOL) 325 MG Tab, Take 2 Tablets by mouth every 6 hours. Alternate w/ ibuprofen to take a medication every 3 hrs, take scheduled for 3 days post-op then PRN, Disp: 60 Tablet, Rfl: 0    ibuprofen (MOTRIN) 600 MG Tab, Take 1 Tablet by mouth every 6 hours. Alternate w/ tylenol to take a medication every 3 hrs, take scheduled for 3 days post-op then PRN, Disp: 45 Tablet, Rfl: 0    polyethylene glycol/lytes (MIRALAX) 17 g Pack, Take 1 Packet by mouth every day. While taking narcotics, hold if greater then 3 BMs a day, Disp: 20 Each, Rfl: 0    Selenium Sulfide 2.25 % Shampoo, 1 Application by Apply externally route every day for  "7 days. Apply to affected area and lather with small amounts of water; leave on skin for 10 minutes, then rinse thoroughly, Disp: 180 mL, Rfl: 2  ALLERGIES:   Allergies   Allergen Reactions    Avocado      Sweating, tongue swelling, nausea     SURGHX:   Past Surgical History:   Procedure Laterality Date    INGUINAL HERNIA REPAIR ROBOTIC XI Bilateral 2/14/2022    Procedure: REPAIR, HERNIA, INGUINAL, ROBOT-ASSISTED, USING DA RENEE XI;  Surgeon: Man Hamm M.D.;  [facility]: SURGERY [facility];  Service: Gen Robotic    BONE SPUR EXCISION       SOCHX:  reports that he has been smoking cigars and cigarettes. He has a 2.50 pack-year smoking history. He has never used smokeless tobacco. He reports current alcohol use. He reports that he does not currently use drugs after having used the following drugs: Inhaled.  FH: Family history was reviewed, no pertinent findings to report     PHYSICAL EXAM:  /78 (BP Location: Left arm, Patient Position: Sitting, BP Cuff Size: Adult)   Pulse (!) 59   Temp 36.7 °C (98.1 °F) (Temporal)   Resp 14   Ht 1.803 m (5' 11\")   Wt 82.9 kg (182 lb 12.2 oz)   SpO2 100%   BMI 25.49 kg/m²      well-developed, well-nourished in no apparent distress, alert and oriented x 3.  Gait: normal    Cervical spine:  Range of motion Slightly limited with Extension and Slightly limited with Lateral rotation  Spurling's testing is POSITIVE with pain radiating into the shoulder and renato-scapular region  Cervical spine tenderness NEGATIVE    Strength testing:     Deltoid, bilateral 5/5  Bicep, bilateral 5/5  Tricep, bilateral 5/5  Wrist Extension, bilateral 5/5  Wrist Flexion, bilateral 5/5  Finger Abduction, bilateral 5/5    Sensation:  INTACT Bilaterally        Reflexes:   Biceps: R 2+/L 2+  Triceps: R 2+/L  2+  Brachial radialis R 2+/L  2+  Navarro's testing is NEGATIVE  The arms are otherwise neurovascularly intact     Shoulder Exam:    RIGHT Shoulder:  No visible swelling   Range of motion " INTACT  Tenderness: Non-tender  Empty Can Testing 5/5  Internal Rotation 5/5  External Rotation 5/5  Lift Off Testing 5/5  Impingement testing Farfan  NEGATIVE  Neer's testing NEGATIVE  Apprehension testing NEGATIVE    LEFT Shoulder:  No visible swelling   Range of motion INTACT  Tenderness: Non-tender  Empty Can Testing 5/5  Internal Rotation 5/5  External Rotation 5/5  Lift Off Testing 5/5  Impingement testing Farfan  NEGATIVE  Neer's testing NEGATIVE  Apprehension testing NEGATIVE    Additional Findings: None      1. Cervicalgia  DX-CERVICAL SPINE-2 OR 3 VIEWS    Referral to Physical Therapy      2. Right cervical radiculopathy  DX-CERVICAL SPINE-2 OR 3 VIEWS    Referral to Physical Therapy        present for 6 months  Pain is at the renato-scapular  Quality is aching,  sore miuscle and tightness    Referral for formal physical therapy   (Jak Ochoa PT), [place]    Return in about 6 weeks (around 9/26/2023).  To see how he is doing with home exercise program and formal physical therapy        8/15/2023 8:27 AM     HISTORY/REASON FOR EXAM:  Atraumatic Pain.  .     TECHNIQUE/EXAM DESCRIPTION AND NUMBER OF VIEWS:  Cervical spine series, 3 views.     COMPARISON:  None.        FINDINGS:  The prevertebral soft tissues are within normal limits.  Straightening of the cervical spine.  There is no acute fracture or dislocation.     C4-C6 moderate disc degeneration with disc narrowing and diffuse disc osteophyte and uncovertebral osteophytes.  There is slight retrolisthesis of C4 on C5.     Bone mineralization is age-appropriate.        IMPRESSION:     1.  No acute abnormality.  2.  C4-C6 moderate disc degeneration.           Exam Ended: 08/15/23  8:40 AM Last Resulted: 08/15/23  8:48 AM           Interpreted in the office today with the patient    Self-referred

## 2023-09-26 ENCOUNTER — OFFICE VISIT (OUTPATIENT)
Dept: SPORTS MEDICINE | Facility: CLINIC | Age: 53
End: 2023-09-26
Payer: COMMERCIAL

## 2023-09-26 VITALS
DIASTOLIC BLOOD PRESSURE: 78 MMHG | WEIGHT: 182.76 LBS | HEART RATE: 68 BPM | TEMPERATURE: 98.2 F | BODY MASS INDEX: 25.59 KG/M2 | HEIGHT: 71 IN | OXYGEN SATURATION: 98 % | RESPIRATION RATE: 16 BRPM | SYSTOLIC BLOOD PRESSURE: 122 MMHG

## 2023-09-26 DIAGNOSIS — M54.2 CERVICALGIA: ICD-10-CM

## 2023-09-26 DIAGNOSIS — M54.12 RIGHT CERVICAL RADICULOPATHY: ICD-10-CM

## 2023-09-26 PROCEDURE — 3078F DIAST BP <80 MM HG: CPT | Performed by: FAMILY MEDICINE

## 2023-09-26 PROCEDURE — 3074F SYST BP LT 130 MM HG: CPT | Performed by: FAMILY MEDICINE

## 2023-09-26 PROCEDURE — 99213 OFFICE O/P EST LOW 20 MIN: CPT | Performed by: FAMILY MEDICINE

## 2023-09-26 ASSESSMENT — FIBROSIS 4 INDEX: FIB4 SCORE: 1.02

## 2023-09-26 NOTE — PROGRESS NOTES
"Chief Complaint   Patient presents with    Shoulder Pain     R shoulder pain      CHIEF COMPLAINT:  Corey Huertas male is self-referred for evaluation of Shoulder pain.     Corey Huertas is complaining of right shoulder pain  present for 6 months  Pain is at the renato-scapular  Quality is aching,  sore miuscle and tightness  Pain is Non-radiating as far as the pain, but he has had some tingling in the arm as well  Aggravated by constant  Improved with  rest and ice and massage   no prior problems with this shoulder in the past, but he has had BILATERAL radicular symptoms in the past  Prior Treatments: chiropractic care  Prior studies: NO Prior imaging has been done   Medications tried for pain include: ibuprofen (OTC)  Mechanical Symptom history: No Locking and Popping which is not necessarily painful    UPDATE;  About 65% improved, PT feels a bit easy and boring    Works as a , industrial  Likes lifting weights, kickboxing and heavy grappling     PHYSICAL EXAM:  /78 (BP Location: Left arm, Patient Position: Sitting, BP Cuff Size: Adult)   Pulse 68   Temp 36.8 °C (98.2 °F) (Temporal)   Resp 16   Ht 1.803 m (5' 11\")   Wt 82.9 kg (182 lb 12.2 oz)   SpO2 98%   BMI 25.49 kg/m²      well-developed, well-nourished in no apparent distress, alert and oriented x 3.  Gait: normal    Cervical spine:  Range of motion Slightly limited with Extension and Slightly limited with Lateral rotation  Spurling's testing is NEGATIVE, but there is some local cervical spine discomfort  Cervical spine tenderness NEGATIVE    Strength testing:     Deltoid, bilateral 5/5  Bicep, bilateral 5/5  Tricep, bilateral 5/5  Wrist Extension, bilateral 5/5  Wrist Flexion, bilateral 5/5  Finger Abduction, bilateral 5/5    Sensation:  INTACT Bilaterally        Reflexes:   Biceps: R 2+/L 2+  Triceps: R 2+/L  2+  Brachial radialis R 2+/L  2+  Navarro's testing is NEGATIVE  The arms are otherwise neurovascularly intact "     1. Cervicalgia        2. Right cervical radiculopathy          present since about February 2023  Pain is at the renato-scapular  Quality is aching,  sore miuscle and tightness    Continue formal physical therapy   (Jak Ochoa PT), [place]    Return in about 3 months (around 12/26/2023).  To see how he is doing with home exercise program and formal physical therapy  He should be done with therapy at that point        8/15/2023 8:27 AM     HISTORY/REASON FOR EXAM:  Atraumatic Pain.  .     TECHNIQUE/EXAM DESCRIPTION AND NUMBER OF VIEWS:  Cervical spine series, 3 views.     COMPARISON:  None.        FINDINGS:  The prevertebral soft tissues are within normal limits.  Straightening of the cervical spine.  There is no acute fracture or dislocation.     C4-C6 moderate disc degeneration with disc narrowing and diffuse disc osteophyte and uncovertebral osteophytes.  There is slight retrolisthesis of C4 on C5.     Bone mineralization is age-appropriate.        IMPRESSION:     1.  No acute abnormality.  2.  C4-C6 moderate disc degeneration.           Exam Ended: 08/15/23  8:40 AM Last Resulted: 08/15/23  8:48 AM           Self-referred

## 2024-09-30 ENCOUNTER — APPOINTMENT (OUTPATIENT)
Dept: RADIOLOGY | Facility: IMAGING CENTER | Age: 54
End: 2024-09-30
Attending: PHYSICIAN ASSISTANT
Payer: COMMERCIAL

## 2024-09-30 ENCOUNTER — OFFICE VISIT (OUTPATIENT)
Dept: URGENT CARE | Facility: CLINIC | Age: 54
End: 2024-09-30
Payer: COMMERCIAL

## 2024-09-30 VITALS
HEART RATE: 80 BPM | HEIGHT: 71 IN | DIASTOLIC BLOOD PRESSURE: 70 MMHG | OXYGEN SATURATION: 100 % | WEIGHT: 184.2 LBS | RESPIRATION RATE: 16 BRPM | TEMPERATURE: 97.8 F | BODY MASS INDEX: 25.79 KG/M2 | SYSTOLIC BLOOD PRESSURE: 110 MMHG

## 2024-09-30 DIAGNOSIS — M25.552 LEFT HIP PAIN: ICD-10-CM

## 2024-09-30 PROCEDURE — 73501 X-RAY EXAM HIP UNI 1 VIEW: CPT | Mod: TC,LT | Performed by: RADIOLOGY

## 2024-09-30 RX ORDER — KETOROLAC TROMETHAMINE 30 MG/ML
30 INJECTION, SOLUTION INTRAMUSCULAR; INTRAVENOUS ONCE
Status: DISCONTINUED | OUTPATIENT
Start: 2024-09-30 | End: 2024-09-30

## 2024-09-30 RX ORDER — METHYLPREDNISOLONE 4 MG
TABLET, DOSE PACK ORAL
Qty: 21 TABLET | Refills: 0 | Status: SHIPPED | OUTPATIENT
Start: 2024-09-30

## 2024-09-30 RX ORDER — KETOROLAC TROMETHAMINE 15 MG/ML
15 INJECTION, SOLUTION INTRAMUSCULAR; INTRAVENOUS ONCE
Status: COMPLETED | OUTPATIENT
Start: 2024-09-30 | End: 2024-09-30

## 2024-09-30 RX ADMIN — KETOROLAC TROMETHAMINE 15 MG: 15 INJECTION, SOLUTION INTRAMUSCULAR; INTRAVENOUS at 09:28

## 2024-09-30 RX ADMIN — KETOROLAC TROMETHAMINE 15 MG: 15 INJECTION, SOLUTION INTRAMUSCULAR; INTRAVENOUS at 09:27

## 2024-09-30 ASSESSMENT — ENCOUNTER SYMPTOMS
HIP PAIN: 1
VOMITING: 0
CHILLS: 0
FEVER: 0
ABDOMINAL PAIN: 0
NAUSEA: 0
FLANK PAIN: 0
SHORTNESS OF BREATH: 0
PALPITATIONS: 0

## 2024-09-30 NOTE — PROGRESS NOTES
Subjective:   Corey Huertas is a 54 y.o. male who presents today with   Chief Complaint   Patient presents with    Hip Pain     Left sided hip pain started last week radiating down the leg making it hard to ambulate     Hip Pain  This is a new problem. The current episode started in the past 7 days. The problem occurs constantly. The problem has been unchanged. Pertinent negatives include no abdominal pain, chest pain, chills, fever, nausea or vomiting.     No recent injury or trauma.  Patient states he took a couple days off of work over the weekend and was able to ice and rest which helped with the swelling go down some.  No lower leg swelling.    PMH:  has a past medical history of Allergy, Bronchitis, COVID-19 (02/03/2022), High cholesterol, Pneumonia (02/2020), and Renal disorder.  MEDS:   Current Outpatient Medications:     methylPREDNISolone (MEDROL DOSEPAK) 4 MG Tablet Therapy Pack, Follow schedule on package instructions., Disp: 21 Tablet, Rfl: 0    Selenium Sulfide 2.25 % Shampoo, 1 Application by Apply externally route every day for 7 days. Apply to affected area and lather with small amounts of water; leave on skin for 10 minutes, then rinse thoroughly, Disp: 180 mL, Rfl: 2    Current Facility-Administered Medications:     ketorolac (Toradol) 15 MG/ML injection 15 mg, 15 mg, Intramuscular, Once,     ketorolac (Toradol) 15 MG/ML injection 15 mg, 15 mg, Intramuscular, Once,   ALLERGIES:   Allergies   Allergen Reactions    Avocado      Sweating, tongue swelling, nausea     SURGHX:   Past Surgical History:   Procedure Laterality Date    INGUINAL HERNIA REPAIR ROBOTIC XI Bilateral 2/14/2022    Procedure: REPAIR, HERNIA, INGUINAL, ROBOT-ASSISTED, USING DA RENEE XI;  Surgeon: Man Hamm M.D.;  Location: SURGERY [facility];  Service: Gen Robotic    BONE SPUR EXCISION       SOCHX:  reports that he has been smoking cigars and cigarettes. He has a 2.5 pack-year smoking history. He has never used smokeless  "tobacco. He reports current alcohol use. He reports that he does not currently use drugs after having used the following drugs: Inhaled.  FH: Reviewed with patient, not pertinent to this visit.     Review of Systems   Constitutional:  Negative for chills and fever.   Respiratory:  Negative for shortness of breath.    Cardiovascular:  Negative for chest pain, palpitations and leg swelling.   Gastrointestinal:  Negative for abdominal pain, nausea and vomiting.   Genitourinary:  Negative for dysuria, flank pain, frequency, hematuria and urgency.   Musculoskeletal:         Left hip pain      Objective:   /70 (BP Location: Left arm, Patient Position: Sitting, BP Cuff Size: Adult)   Pulse 80   Temp 36.6 °C (97.8 °F) (Temporal)   Resp 16   Ht 1.803 m (5' 11\")   Wt 83.6 kg (184 lb 3.2 oz)   SpO2 100%   BMI 25.69 kg/m²   Physical Exam  Vitals and nursing note reviewed.   Constitutional:       General: He is not in acute distress.     Appearance: Normal appearance. He is well-developed. He is not ill-appearing or toxic-appearing.   HENT:      Head: Normocephalic and atraumatic.      Right Ear: Hearing normal.      Left Ear: Hearing normal.   Cardiovascular:      Rate and Rhythm: Normal rate.   Pulmonary:      Effort: Pulmonary effort is normal.   Musculoskeletal:      Right lower leg: No edema.      Left lower leg: No edema.        Legs:       Comments: Tenderness to palpation to the left hip with some mild swelling.  No erythema.  No induration or fluctuance.  Discomfort with movement of the left hip and palpation to the area.  No significant tenderness through the back or midline of the spine. No open wound or abrasion.  Antalgic gait favoring the left side.   Skin:     General: Skin is warm and dry.   Neurological:      Mental Status: He is alert.      Coordination: Coordination normal.   Psychiatric:         Mood and Affect: Mood normal.       DX HIP  FINDINGS:  There is normal bony mineralization of the hips " and pelvis. There is no evidence of hip or pelvic fracture or osseous lesion. The sacroiliac joints and pubic symphysis are symmetrical     IMPRESSION:     No radiographic evidence of acute traumatic injury.    Assessment/Plan:   Assessment    1. Left hip pain  - DX-HIP-UNILATERAL-WITH PELVIS-1 VIEW LEFT; Future  - methylPREDNISolone (MEDROL DOSEPAK) 4 MG Tablet Therapy Pack; Follow schedule on package instructions.  Dispense: 21 Tablet; Refill: 0  - ketorolac (Toradol) 15 MG/ML injection 15 mg  - ketorolac (Toradol) 15 MG/ML injection 15 mg  - Referral to Orthopedics    Symptoms somewhat consistent with sciatic pain radiating into the left lower extremity.  Could also be some arthritic changes to the left hip.  Patient understands not to use any further NSAIDs for at least 24 hours following Toradol shot.  Previous GFR within normal range.  Will also prescribe steroid if symptoms persist.  No signs of DVT on exam today but discussed symptoms to monitor for and have further evaluation if any occur.    Differential diagnosis, natural history, supportive care, and indications for immediate follow-up discussed.   Patient given instructions and understanding of medications and treatment.    If not improving in 3-5 days, F/U with PCP or return to  if symptoms worsen.    Patient agreeable to plan.    Please note that this dictation was created using voice recognition software. I have made every reasonable attempt to correct obvious errors, but I expect that there are errors of grammar and possibly content that I did not discover before finalizing the note.    Umair Turpin PA-C

## 2024-10-08 ENCOUNTER — OFFICE VISIT (OUTPATIENT)
Dept: SPORTS MEDICINE | Facility: OTHER | Age: 54
End: 2024-10-08
Payer: COMMERCIAL

## 2024-10-08 VITALS
SYSTOLIC BLOOD PRESSURE: 118 MMHG | RESPIRATION RATE: 16 BRPM | BODY MASS INDEX: 25.79 KG/M2 | HEART RATE: 78 BPM | OXYGEN SATURATION: 97 % | DIASTOLIC BLOOD PRESSURE: 74 MMHG | HEIGHT: 71 IN | TEMPERATURE: 98.1 F | WEIGHT: 184.2 LBS

## 2024-10-08 DIAGNOSIS — M70.62 TROCHANTERIC BURSITIS, LEFT HIP: ICD-10-CM

## 2024-10-08 PROCEDURE — 3074F SYST BP LT 130 MM HG: CPT | Performed by: FAMILY MEDICINE

## 2024-10-08 PROCEDURE — 99213 OFFICE O/P EST LOW 20 MIN: CPT | Performed by: FAMILY MEDICINE

## 2024-10-08 PROCEDURE — 3078F DIAST BP <80 MM HG: CPT | Performed by: FAMILY MEDICINE

## 2024-12-16 ENCOUNTER — TELEPHONE (OUTPATIENT)
Dept: SURGICAL ONCOLOGY | Facility: MEDICAL CENTER | Age: 54
End: 2024-12-16
Payer: COMMERCIAL

## 2025-01-09 ENCOUNTER — OFFICE VISIT (OUTPATIENT)
Dept: SURGICAL ONCOLOGY | Facility: MEDICAL CENTER | Age: 55
End: 2025-01-09
Payer: COMMERCIAL

## 2025-01-09 ENCOUNTER — APPOINTMENT (OUTPATIENT)
Dept: RADIOLOGY | Facility: IMAGING CENTER | Age: 55
End: 2025-01-09
Attending: ORTHOPAEDIC SURGERY
Payer: COMMERCIAL

## 2025-01-09 VITALS
BODY MASS INDEX: 25.94 KG/M2 | HEART RATE: 72 BPM | WEIGHT: 186 LBS | DIASTOLIC BLOOD PRESSURE: 62 MMHG | SYSTOLIC BLOOD PRESSURE: 124 MMHG | OXYGEN SATURATION: 97 % | TEMPERATURE: 97.8 F

## 2025-01-09 DIAGNOSIS — M70.62 GREATER TROCHANTERIC BURSITIS OF LEFT HIP: ICD-10-CM

## 2025-01-09 DIAGNOSIS — M25.552 LEFT HIP PAIN: ICD-10-CM

## 2025-01-09 PROCEDURE — 20610 DRAIN/INJ JOINT/BURSA W/O US: CPT | Mod: LT | Performed by: ORTHOPAEDIC SURGERY

## 2025-01-09 PROCEDURE — 3074F SYST BP LT 130 MM HG: CPT | Performed by: ORTHOPAEDIC SURGERY

## 2025-01-09 PROCEDURE — 99203 OFFICE O/P NEW LOW 30 MIN: CPT | Mod: 25 | Performed by: ORTHOPAEDIC SURGERY

## 2025-01-09 PROCEDURE — 73501 X-RAY EXAM HIP UNI 1 VIEW: CPT | Mod: TC,LT | Performed by: ORTHOPAEDIC SURGERY

## 2025-01-09 PROCEDURE — 3078F DIAST BP <80 MM HG: CPT | Performed by: ORTHOPAEDIC SURGERY

## 2025-01-09 PROCEDURE — 72170 X-RAY EXAM OF PELVIS: CPT | Mod: TC,59 | Performed by: ORTHOPAEDIC SURGERY

## 2025-01-09 ASSESSMENT — ENCOUNTER SYMPTOMS
MYALGIAS: 1
SENSORY CHANGE: 0
WEAKNESS: 0

## 2025-01-09 NOTE — PROGRESS NOTES
[facility] Orthopedic Surgery    Subjective:   1/9/2025  2:30 PM  Primary care physician:Pcp Pt States None    Chief Complaint:   Left hip pain    History of presenting illness:  Corey Huertas  is a pleasant 54 y.o. male who was referred for evaluation of left hip pain.  Patient reports several months ago he injured the hip while grappling.  He noticed swelling across the anterior and lateral aspect of the hip.  He did receive a corticosteroid injection at urgent care and has been taking anti-inflammatories intermittently which provide temporary relief.  He notes that the pain has slowly progressed and returned.  Now he primarily feels pain across the lateral aspect of the hip.  He denies any numbness or paresthesias down the leg.  He denies any groin pain.  His symptoms typically worsen after grappling.    History of diabetes: no  History of tobacco use: former  History of renal disease: no  Use of anticoagulants: no  Prior surgeries on this limb/joint: no    Past Medical History:   Diagnosis Date    Allergy     Bronchitis     early 2000    COVID-19 02/03/2022    Positive covid test, Asymptomatic.     High cholesterol     Pneumonia 02/2020    Renal disorder     cyst     Past Surgical History:   Procedure Laterality Date    INGUINAL HERNIA REPAIR ROBOTIC XI Bilateral 2/14/2022    Procedure: REPAIR, HERNIA, INGUINAL, ROBOT-ASSISTED, USING DA RENEE XI;  Surgeon: Man Hamm M.D.;  Location: SURGERY [facility];  Service: Gen Robotic    BONE SPUR EXCISION       Allergies   Allergen Reactions    Avocado      Sweating, tongue swelling, nausea     Outpatient Encounter Medications as of 1/9/2025   Medication Sig Dispense Refill    methylPREDNISolone (MEDROL DOSEPAK) 4 MG Tablet Therapy Pack Follow schedule on package instructions. (Patient not taking: Reported on 1/9/2025) 21 Tablet 0    Selenium Sulfide 2.25 % Shampoo 1 Application by Apply externally route every day for 7 days. Apply to affected area and lather with  small amounts of water; leave on skin for 10 minutes, then rinse thoroughly (Patient not taking: Reported on 1/9/2025) 180 mL 2     No facility-administered encounter medications on file as of 1/9/2025.     Social History     Socioeconomic History    Marital status:      Spouse name: Not on file    Number of children: Not on file    Years of education: Not on file    Highest education level: Not on file   Occupational History     Comment: service    Tobacco Use    Smoking status: Light Smoker     Current packs/day: 0.25     Average packs/day: 0.3 packs/day for 10.0 years (2.5 ttl pk-yrs)     Types: Cigars, Cigarettes    Smokeless tobacco: Never    Tobacco comments:     2 cigars weekly    Vaping Use    Vaping status: Every Day    Substances: Nicotine   Substance and Sexual Activity    Alcohol use: Yes     Comment: socially/weekends    Drug use: Not Currently     Types: Inhaled    Sexual activity: Yes     Partners: Female   Other Topics Concern    Not on file   Social History Narrative    Not on file     Social Drivers of Health     Financial Resource Strain: Not on file   Food Insecurity: Not on file   Transportation Needs: Not on file   Physical Activity: Not on file   Stress: Not on file   Social Connections: Not on file   Intimate Partner Violence: Not on file   Housing Stability: Not on file      Social History     Tobacco Use   Smoking Status Light Smoker    Current packs/day: 0.25    Average packs/day: 0.3 packs/day for 10.0 years (2.5 ttl pk-yrs)    Types: Cigars, Cigarettes   Smokeless Tobacco Never   Tobacco Comments    2 cigars weekly      Social History     Substance and Sexual Activity   Alcohol Use Yes    Comment: socially/weekends     Social History     Substance and Sexual Activity   Drug Use Not Currently    Types: Inhaled        Family History   Problem Relation Age of Onset    Diabetes Mother     Heart Disease Mother     Diabetes Sister     Diabetes Maternal Grandmother     Diabetes Maternal  Grandfather     Diabetes Paternal Grandmother     Diabetes Paternal Grandfather     Heart Disease Father     Hypertension Father        Review of Systems   Musculoskeletal:  Positive for joint pain and myalgias.   Neurological:  Negative for sensory change and weakness.        Objective:   /62 (BP Location: Left arm, Patient Position: Sitting, BP Cuff Size: Adult)   Pulse 72   Temp 36.6 °C (97.8 °F) (Temporal)   Wt 84.4 kg (186 lb)   SpO2 97%   BMI 25.94 kg/m²     Physical Exam  Constitutional:       General: He is not in acute distress.     Appearance: Normal appearance.   HENT:      Head: Normocephalic and atraumatic.      Right Ear: External ear normal.      Left Ear: External ear normal.      Nose: Nose normal.      Mouth/Throat:      Mouth: Mucous membranes are moist.   Eyes:      Extraocular Movements: Extraocular movements intact.      Conjunctiva/sclera: Conjunctivae normal.   Cardiovascular:      Rate and Rhythm: Normal rate.      Pulses: Normal pulses.   Pulmonary:      Effort: Pulmonary effort is normal. No respiratory distress.   Musculoskeletal:      Cervical back: Normal range of motion and neck supple.   Skin:     General: Skin is warm and dry.      Capillary Refill: Capillary refill takes less than 2 seconds.   Neurological:      Mental Status: He is alert and oriented to person, place, and time.   Psychiatric:         Mood and Affect: Mood normal.         Behavior: Behavior normal.       Hip Exam -   left hip: Overlying skin demonstrates no erythema, ecchymoses or wounds. Hip ROM is Intact.  SILT spn, dpn, plantar and sural nerves. Motor intact to knee extension, ankle dorsiflexion, ankle plantar flexion, and eversion. DP/PT pulse 2+. There is tenderness at the greater trochanter. There is tenderness within the gluteus muscle belly. negative FADIR. negative Stinchfield.       Imaging:  Multiple views of the pelvis and left hip demonstrates some small calcifications within the left labrum  otherwise no significant degenerative changes in the bilateral hips.    Diagnosis:     1. Greater trochanteric bursitis of left hip                Assessment/Plan:   I counseled the patient regarding the above diagnosis.  I think his symptoms are most consistent with greater trochanteric bursitis.  We did discuss the calcifications in the labrum which could be related to some degenerative changes in the labrum and hip.  However, he is not having pain symptoms consistent with intra-articular pathology.  I did recommend a corticosteroid injection today for the trochanteric bursa.  I also recommended and demonstrated several stretches he can do at home.  If his symptoms worsen or fail to improve over the next couple months I recommended he return for follow-up and we may consider getting an MRI of the hip at that point.  He was in agreement with this plan.  Please see separate procedure note for the steroid injection.    Referrals: none  Restrictions: none  New medications/refills: none  Imaging studies: none  Follow-up: as needed      Clyde Cunningham, DO  Orthopedic Oncology and General Orthopedics

## 2025-01-27 SDOH — ECONOMIC STABILITY: INCOME INSECURITY: HOW HARD IS IT FOR YOU TO PAY FOR THE VERY BASICS LIKE FOOD, HOUSING, MEDICAL CARE, AND HEATING?: PATIENT DECLINED

## 2025-01-27 SDOH — HEALTH STABILITY: PHYSICAL HEALTH: ON AVERAGE, HOW MANY DAYS PER WEEK DO YOU ENGAGE IN MODERATE TO STRENUOUS EXERCISE (LIKE A BRISK WALK)?: 3 DAYS

## 2025-01-27 SDOH — ECONOMIC STABILITY: TRANSPORTATION INSECURITY

## 2025-01-27 SDOH — ECONOMIC STABILITY: FOOD INSECURITY: WITHIN THE PAST 12 MONTHS, YOU WORRIED THAT YOUR FOOD WOULD RUN OUT BEFORE YOU GOT MONEY TO BUY MORE.: PATIENT DECLINED

## 2025-01-27 SDOH — ECONOMIC STABILITY: INCOME INSECURITY: IN THE LAST 12 MONTHS, WAS THERE A TIME WHEN YOU WERE NOT ABLE TO PAY THE MORTGAGE OR RENT ON TIME?: PATIENT DECLINED

## 2025-01-27 SDOH — ECONOMIC STABILITY: TRANSPORTATION INSECURITY
IN THE PAST 12 MONTHS, HAS THE LACK OF TRANSPORTATION KEPT YOU FROM MEDICAL APPOINTMENTS OR FROM GETTING MEDICATIONS?: PATIENT DECLINED

## 2025-01-27 SDOH — HEALTH STABILITY: PHYSICAL HEALTH: ON AVERAGE, HOW MANY MINUTES DO YOU ENGAGE IN EXERCISE AT THIS LEVEL?: 50 MIN

## 2025-01-27 SDOH — ECONOMIC STABILITY: FOOD INSECURITY: WITHIN THE PAST 12 MONTHS, THE FOOD YOU BOUGHT JUST DIDN'T LAST AND YOU DIDN'T HAVE MONEY TO GET MORE.: PATIENT DECLINED

## 2025-01-27 SDOH — ECONOMIC STABILITY: HOUSING INSECURITY
IN THE LAST 12 MONTHS, WAS THERE A TIME WHEN YOU DID NOT HAVE A STEADY PLACE TO SLEEP OR SLEPT IN A SHELTER (INCLUDING NOW)?: PATIENT DECLINED

## 2025-01-27 SDOH — HEALTH STABILITY: MENTAL HEALTH
STRESS IS WHEN SOMEONE FEELS TENSE, NERVOUS, ANXIOUS, OR CAN'T SLEEP AT NIGHT BECAUSE THEIR MIND IS TROUBLED. HOW STRESSED ARE YOU?: TO SOME EXTENT

## 2025-01-27 ASSESSMENT — SOCIAL DETERMINANTS OF HEALTH (SDOH)
DO YOU BELONG TO ANY CLUBS OR ORGANIZATIONS SUCH AS CHURCH GROUPS UNIONS, FRATERNAL OR ATHLETIC GROUPS, OR SCHOOL GROUPS?: YES
HOW OFTEN DO YOU ATTEND CHURCH OR RELIGIOUS SERVICES?: PATIENT DECLINED
HOW OFTEN DO YOU GET TOGETHER WITH FRIENDS OR RELATIVES?: PATIENT DECLINED
DO YOU BELONG TO ANY CLUBS OR ORGANIZATIONS SUCH AS CHURCH GROUPS UNIONS, FRATERNAL OR ATHLETIC GROUPS, OR SCHOOL GROUPS?: YES
WITHIN THE PAST 12 MONTHS, YOU WORRIED THAT YOUR FOOD WOULD RUN OUT BEFORE YOU GOT THE MONEY TO BUY MORE: PATIENT DECLINED
HOW OFTEN DO YOU HAVE A DRINK CONTAINING ALCOHOL: PATIENT DECLINED
HOW MANY DRINKS CONTAINING ALCOHOL DO YOU HAVE ON A TYPICAL DAY WHEN YOU ARE DRINKING: PATIENT DECLINED
HOW OFTEN DO YOU ATTENT MEETINGS OF THE CLUB OR ORGANIZATION YOU BELONG TO?: MORE THAN 4 TIMES PER YEAR
IN A TYPICAL WEEK, HOW MANY TIMES DO YOU TALK ON THE PHONE WITH FAMILY, FRIENDS, OR NEIGHBORS?: PATIENT DECLINED
HOW OFTEN DO YOU ATTENT MEETINGS OF THE CLUB OR ORGANIZATION YOU BELONG TO?: MORE THAN 4 TIMES PER YEAR
HOW HARD IS IT FOR YOU TO PAY FOR THE VERY BASICS LIKE FOOD, HOUSING, MEDICAL CARE, AND HEATING?: PATIENT DECLINED
HOW OFTEN DO YOU GET TOGETHER WITH FRIENDS OR RELATIVES?: PATIENT DECLINED
IN A TYPICAL WEEK, HOW MANY TIMES DO YOU TALK ON THE PHONE WITH FAMILY, FRIENDS, OR NEIGHBORS?: PATIENT DECLINED
HOW OFTEN DO YOU ATTEND CHURCH OR RELIGIOUS SERVICES?: PATIENT DECLINED
IN THE PAST 12 MONTHS, HAS THE ELECTRIC, GAS, OIL, OR WATER COMPANY THREATENED TO SHUT OFF SERVICE IN YOUR HOME?: PATIENT DECLINED
HOW OFTEN DO YOU HAVE SIX OR MORE DRINKS ON ONE OCCASION: PATIENT DECLINED

## 2025-01-27 ASSESSMENT — LIFESTYLE VARIABLES
SKIP TO QUESTIONS 9-10: 0
HOW MANY STANDARD DRINKS CONTAINING ALCOHOL DO YOU HAVE ON A TYPICAL DAY: PATIENT DECLINED
HOW OFTEN DO YOU HAVE A DRINK CONTAINING ALCOHOL: PATIENT DECLINED
AUDIT-C TOTAL SCORE: -1
HOW OFTEN DO YOU HAVE SIX OR MORE DRINKS ON ONE OCCASION: PATIENT DECLINED

## 2025-01-28 ENCOUNTER — APPOINTMENT (OUTPATIENT)
Dept: MEDICAL GROUP | Facility: IMAGING CENTER | Age: 55
End: 2025-01-28
Payer: COMMERCIAL

## 2025-01-28 VITALS
RESPIRATION RATE: 16 BRPM | OXYGEN SATURATION: 99 % | SYSTOLIC BLOOD PRESSURE: 112 MMHG | BODY MASS INDEX: 25.76 KG/M2 | TEMPERATURE: 98.1 F | HEART RATE: 70 BPM | HEIGHT: 71 IN | WEIGHT: 184 LBS | DIASTOLIC BLOOD PRESSURE: 60 MMHG

## 2025-01-28 DIAGNOSIS — Z11.4 ENCOUNTER FOR SCREENING FOR HIV: ICD-10-CM

## 2025-01-28 DIAGNOSIS — Z11.59 NEED FOR HEPATITIS C SCREENING TEST: ICD-10-CM

## 2025-01-28 DIAGNOSIS — E78.5 DYSLIPIDEMIA: ICD-10-CM

## 2025-01-28 DIAGNOSIS — Z12.11 SCREENING FOR COLON CANCER: ICD-10-CM

## 2025-01-28 DIAGNOSIS — N18.2 STAGE 2 CHRONIC KIDNEY DISEASE: ICD-10-CM

## 2025-01-28 DIAGNOSIS — Z23 NEED FOR VACCINATION: ICD-10-CM

## 2025-01-28 DIAGNOSIS — Z12.5 PROSTATE CANCER SCREENING: ICD-10-CM

## 2025-01-28 DIAGNOSIS — R22.31 MASS OF RIGHT WRIST: ICD-10-CM

## 2025-01-28 DIAGNOSIS — Z00.00 WELLNESS EXAMINATION: ICD-10-CM

## 2025-01-28 PROBLEM — R19.09 MASS OF LEFT INGUINAL REGION: Status: RESOLVED | Noted: 2021-11-23 | Resolved: 2025-01-28

## 2025-01-28 PROCEDURE — 90677 PCV20 VACCINE IM: CPT | Performed by: INTERNAL MEDICINE

## 2025-01-28 PROCEDURE — 90471 IMMUNIZATION ADMIN: CPT | Performed by: INTERNAL MEDICINE

## 2025-01-28 PROCEDURE — 99396 PREV VISIT EST AGE 40-64: CPT | Mod: 25 | Performed by: INTERNAL MEDICINE

## 2025-01-28 NOTE — PROGRESS NOTES
CC: Well annual exam    Subjective:     Corey Huertas is a 54 y.o. male presents for a routine preventive health exam.      Health Maintenance  Encourage a healthy diet and regular exercise.  BP Screening: good  Cholesterol Screening: ordered   Prediabetes/Diabetes screening: ordered   Depression screening: negative  Anxiety screening: negative   Statin Use: NA  Aspirin Use: NA  Tobacco use: Stop Nov 2024. 1 Cigar per day for 15 years Counseling provided.  ETOH use:  rarely, only on special occasion. Counseling provided.  Unhealthy drug use: No known recreational drug use. Counseling provided.  Safe in the relationship.   Seat belts, bike helmets, and gun safety were discussed.  Sun protection used.  Fall prevention were discussed.      Cancer screening  Colorectal Cancer Screening: referral placed  Lung Cancer Screening: NA   Prostate Cancer Screening/PSA: ordered  Infectious disease screening/Immunizations  --STI Screening: None   --Practices safe sex: Yes  --HIV Screening: ordered   --Hepatitis C Screening: ordered   --Immunizations:   Flu vaccine: he declines  Tdap: 10/12/2020  COVID-19 vaccine: patient declines Pfizer Other immunizations:   PCV20: ordered today  PCV15:  PPSV23: 10/2/2019  PCV13:               Review of systems:      Allergies, past medical history, past surgical history, medications, family history, social history reviewed and updated.      Current Outpatient Medications:     multivitamin Tab, Take 1 Tablet by mouth every day., Disp: , Rfl:     Allergies   Allergen Reactions    Avocado      Sweating, tongue swelling, nausea       Past Medical History:   Diagnosis Date    Allergy     Anxiety     Bronchitis     early 2000    COVID-19 02/03/2022    Positive covid test, Asymptomatic.     Depression     Diabetes (HCC)     High cholesterol     Hypertension     Pneumonia 02/2020    Renal disorder     cyst     Past Surgical History:   Procedure Laterality Date    INGUINAL HERNIA REPAIR ROBOTIC XI  Bilateral 02/14/2022    Procedure: REPAIR, HERNIA, INGUINAL, ROBOT-ASSISTED, USING DA RENEE XI;  Surgeon: Man Hamm M.D.;  Location: SURGERY [facility];  Service: Gen Robotic    BONE SPUR EXCISION      HERNIA REPAIR       Family History   Problem Relation Age of Onset    Diabetes Mother     Heart Disease Mother     Diabetes Sister     Diabetes Maternal Grandmother     Diabetes Maternal Grandfather     Diabetes Paternal Grandmother     Diabetes Paternal Grandfather     Heart Disease Father     Hypertension Father     Diabetes Maternal Uncle     Diabetes Paternal Aunt      Social History     Socioeconomic History    Marital status:      Spouse name: Not on file    Number of children: Not on file    Years of education: Not on file    Highest education level: Associate degree: occupational, technical, or vocational program   Occupational History     Comment: service    Tobacco Use    Smoking status: Some Days     Types: Cigars    Smokeless tobacco: Never    Tobacco comments:     2 cigars weekly    Vaping Use    Vaping status: Every Day    Substances: Nicotine   Substance and Sexual Activity    Alcohol use: Yes     Comment: socially/weekends    Drug use: Not Currently     Types: Inhaled    Sexual activity: Yes     Partners: Female   Other Topics Concern    Not on file   Social History Narrative    Not on file     Social Drivers of Health     Financial Resource Strain: Patient Declined (1/27/2025)    Overall Financial Resource Strain (CARDIA)     Difficulty of Paying Living Expenses: Patient declined   Food Insecurity: Patient Declined (1/27/2025)    Hunger Vital Sign     Worried About Running Out of Food in the Last Year: Patient declined     Ran Out of Food in the Last Year: Patient declined   Transportation Needs: Unknown (1/27/2025)    PRAPARE - Transportation     Lack of Transportation (Medical): Patient declined     Lack of Transportation (Non-Medical): Not on file   Physical Activity: Sufficiently  "Active (1/27/2025)    Exercise Vital Sign     Days of Exercise per Week: 3 days     Minutes of Exercise per Session: 50 min   Stress: Stress Concern Present (1/27/2025)    Citizen of [place] [employer] - Occupational Stress Questionnaire     Feeling of Stress : To some extent   Social Connections: Unknown (1/27/2025)    Social Connection and Isolation Panel [NHANES]     Frequency of Communication with Friends and Family: Patient declined     Frequency of Social Gatherings with Friends and Family: Patient declined     Attends Zoroastrian Services: Patient declined     Active Member of Clubs or Organizations: Yes     Attends Club or Organization Meetings: More than 4 times per year     Marital Status:    Intimate Partner Violence: Not on file   Housing Stability: Patient Declined (1/27/2025)    Housing Stability Vital Sign     Unable to Pay for Housing in the Last Year: Patient declined     Number of Times Moved in the Last Year: Not on file     Homeless in the Last Year: Patient declined       Objective:     Vitals: /60 (BP Location: Left arm, Patient Position: Sitting, BP Cuff Size: Large adult)   Pulse 70   Temp 36.7 °C (98.1 °F) (Temporal)   Resp 16   Ht 1.803 m (5' 11\")   Wt 83.5 kg (184 lb)   SpO2 99%   BMI 25.66 kg/m²   General: Alert, pleasant, NAD    Physical Exam  Constitutional:       Appearance: Normal appearance.   HENT:      Head: Normocephalic and atraumatic.      Right Ear: Ear canal normal.      Left Ear: Ear canal normal.      Nose: Nose normal.      Mouth/Throat:      Mouth: Mucous membranes are moist.      Pharynx: Oropharynx is clear.   Eyes:      Extraocular Movements: Extraocular movements intact.      Pupils: Pupils are equal, round, and reactive to light.   Cardiovascular:      Rate and Rhythm: Normal rate and regular rhythm.      Pulses: Normal pulses.      Heart sounds: Normal heart sounds. No murmur heard.  Pulmonary:      Effort: Pulmonary effort is normal.     "  Breath sounds: Normal breath sounds.   Abdominal:      General: Abdomen is flat. There is no distension.      Palpations: Abdomen is soft.   Musculoskeletal:      Cervical back: Normal range of motion and neck supple.      Comments: Right lateral wrist lump, non-tender, non-mobile   Skin:     General: Skin is warm.   Neurological:      General: No focal deficit present.      Mental Status: He is alert.   Psychiatric:         Mood and Affect: Mood normal.         Behavior: Behavior normal.         Thought Content: Thought content normal.         Judgment: Judgment normal.           Assessment/Plan:     Corey was seen today for annual exam.    Diagnoses and all orders for this visit:    Wellness examination  -     CBC WITH DIFFERENTIAL; Future  -     Comp Metabolic Panel; Future  -     Lipid Profile; Future  -     TSH WITH REFLEX TO FT4; Future  -     VITAMIN D,25 HYDROXY (DEFICIENCY); Future  -     URINALYSIS,CULTURE IF INDICATED; Future    Mass of right wrist  Right lateral wrist mass for several months  -     DX-WRIST-COMPLETE 3+ RIGHT; Future  -     Referral to Orthopedics    Dyslipidemia  -     Lipid Profile; Future  -     TSH WITH REFLEX TO FT4; Future    Stage 2 chronic kidney disease  -     Comp Metabolic Panel; Future    Prostate cancer screening  -     PROSTATE SPECIFIC AG SCREENING; Future    Encounter for screening for HIV  -     HIV AG/AB COMBO ASSAY SCREENING; Future    Need for hepatitis C screening test  -     HEP C VIRUS ANTIBODY; Future    Screening for colon cancer  -     Referral to GI for Colonoscopy    Need for vaccination  -     Pneumococcal Conjugate Vaccine 20-Valent          Patient Counseling:  --Discussed healthful lifestyle measures such as moderation in sodium/caffeine intake, saturated fat and cholesterol, caloric balance, sufficient fresh fruits/vegetables, fiber, vitamin D replacement, and good sleep hygiene.  --Encouraged regular exercise.   --Discussed brushing, flossing, and dental  visits.   --Discussed tobacco, alcohol, or other unhealthy drug use availability of abuse treatment.   --Discussed safe sex practices.  --Injury prevention: Discussed safety belts, safety helmets, smoke/CO detectors, etc.    Return in about 2 months (around 3/28/2025), or if symptoms worsen or fail to improve.

## 2025-02-14 ENCOUNTER — APPOINTMENT (OUTPATIENT)
Dept: MEDICAL GROUP | Facility: MEDICAL CENTER | Age: 55
End: 2025-02-14
Payer: COMMERCIAL

## 2025-04-16 ENCOUNTER — APPOINTMENT (OUTPATIENT)
Dept: RADIOLOGY | Facility: MEDICAL CENTER | Age: 55
End: 2025-04-16
Attending: INTERNAL MEDICINE
Payer: COMMERCIAL

## 2025-04-16 ENCOUNTER — RESULTS FOLLOW-UP (OUTPATIENT)
Dept: MEDICAL GROUP | Facility: IMAGING CENTER | Age: 55
End: 2025-04-16

## 2025-04-16 DIAGNOSIS — R22.31 MASS OF RIGHT WRIST: ICD-10-CM

## 2025-04-16 PROCEDURE — 73110 X-RAY EXAM OF WRIST: CPT | Mod: RT

## 2025-05-27 ENCOUNTER — APPOINTMENT (OUTPATIENT)
Dept: LAB | Facility: MEDICAL CENTER | Age: 55
End: 2025-05-27
Payer: COMMERCIAL

## 2025-07-01 ENCOUNTER — APPOINTMENT (OUTPATIENT)
Facility: MEDICAL CENTER | Age: 55
End: 2025-07-01
Payer: COMMERCIAL

## 2025-07-01 VITALS
TEMPERATURE: 97.6 F | DIASTOLIC BLOOD PRESSURE: 70 MMHG | OXYGEN SATURATION: 100 % | BODY MASS INDEX: 27.35 KG/M2 | SYSTOLIC BLOOD PRESSURE: 128 MMHG | HEIGHT: 71 IN | WEIGHT: 195.33 LBS | HEART RATE: 78 BPM

## 2025-07-01 DIAGNOSIS — M67.431 GANGLION CYST OF VOLAR ASPECT OF RIGHT WRIST: Primary | ICD-10-CM

## 2025-07-01 PROCEDURE — 3074F SYST BP LT 130 MM HG: CPT | Performed by: ORTHOPAEDIC SURGERY

## 2025-07-01 PROCEDURE — 99214 OFFICE O/P EST MOD 30 MIN: CPT | Performed by: ORTHOPAEDIC SURGERY

## 2025-07-01 PROCEDURE — 3078F DIAST BP <80 MM HG: CPT | Performed by: ORTHOPAEDIC SURGERY

## 2025-07-01 ASSESSMENT — ENCOUNTER SYMPTOMS
SHORTNESS OF BREATH: 0
NECK PAIN: 1
TINGLING: 1
WEAKNESS: 0

## 2025-07-01 NOTE — PROGRESS NOTES
HPI: Corey is a pleasant 55-year-old male that returns for follow-up of his left hip trochanteric bursitis.  He reports his symptoms have greatly improved since the last corticosteroid injection.  He continues to do the stretching exercises we discussed and this is helping as well.  He actually returns for a new problem.  He has noticed a small mass on the volar right wrist.  He is right-hand dominant.  He first noticed the mass in December 2024.  He thinks it is slightly grown in size since then.  It is only painful if he presses on the mass.  It is irritating when he bends the wrist.  He denies any constant numbness or paresthesias, does report some intermittent paresthesias related to a neck issue down the right arm.  No weakness.    Past Medical History:   Past Medical History[1]    Past Surgical History:  Past Surgical History[2]    Encounter Medications[3]     Allergies:   Allergies[4]    Family History:   Family History   Problem Relation Age of Onset    Diabetes Mother     Heart Disease Mother     Diabetes Sister     Diabetes Maternal Grandmother     Diabetes Maternal Grandfather     Diabetes Paternal Grandmother     Diabetes Paternal Grandfather     Heart Disease Father     Hypertension Father     Diabetes Maternal Uncle     Diabetes Paternal Aunt        Social History:   Tobacco Use History[5]  Social History     Substance and Sexual Activity   Alcohol Use Yes    Comment: socially/weekends     Social History     Substance and Sexual Activity   Drug Use Not Currently    Types: Inhaled     Social History     Socioeconomic History    Marital status:      Spouse name: Not on file    Number of children: Not on file    Years of education: Not on file    Highest education level: Associate degree: occupational, technical, or vocational program   Occupational History     Comment: service    Tobacco Use    Smoking status: Some Days     Types: Cigars    Smokeless tobacco: Never    Tobacco  comments:     2 cigars weekly    Vaping Use    Vaping status: Every Day    Substances: Nicotine   Substance and Sexual Activity    Alcohol use: Yes     Comment: socially/weekends    Drug use: Not Currently     Types: Inhaled    Sexual activity: Yes     Partners: Female   Other Topics Concern    Not on file   Social History Narrative    Not on file     Social Drivers of Health     Financial Resource Strain: Patient Declined (1/27/2025)    Overall Financial Resource Strain (CARDIA)     Difficulty of Paying Living Expenses: Patient declined   Food Insecurity: Patient Declined (1/27/2025)    Hunger Vital Sign     Worried About Running Out of Food in the Last Year: Patient declined     Ran Out of Food in the Last Year: Patient declined   Transportation Needs: Unknown (1/27/2025)    PRAPARE - Transportation     Lack of Transportation (Medical): Patient declined     Lack of Transportation (Non-Medical): Not on file   Physical Activity: Sufficiently Active (1/27/2025)    Exercise Vital Sign     Days of Exercise per Week: 3 days     Minutes of Exercise per Session: 50 min   Stress: Stress Concern Present (1/27/2025)    [employer] [place] [employer] - Occupational Stress Questionnaire     Feeling of Stress : To some extent   Social Connections: Unknown (1/27/2025)    Social Connection and Isolation Panel [NHANES]     Frequency of Communication with Friends and Family: Patient declined     Frequency of Social Gatherings with Friends and Family: Patient declined     Attends Confucianist Services: Patient declined     Active Member of Clubs or Organizations: Yes     Attends Club or Organization Meetings: More than 4 times per year     Marital Status:    Intimate Partner Violence: Not on file   Housing Stability: Patient Declined (1/27/2025)    Housing Stability Vital Sign     Unable to Pay for Housing in the Last Year: Patient declined     Number of Times Moved in the Last Year: Not on file     Homeless in the  "Last Year: Patient declined       Review of Systems:  Review of Systems   Respiratory:  Negative for shortness of breath.    Cardiovascular:  Negative for chest pain.   Musculoskeletal:  Positive for neck pain.   Neurological:  Positive for tingling. Negative for weakness.        Intermittent paresthesias       Physical Exam:  /70 (BP Location: Right arm, Patient Position: Sitting, BP Cuff Size: Adult)   Pulse 78   Temp 36.4 °C (97.6 °F) (Temporal)   Ht 1.803 m (5' 11\")   Wt 88.6 kg (195 lb 5.2 oz)   SpO2 100%   BMI 27.24 kg/m²     Physical Exam  Constitutional:       Appearance: Normal appearance.   HENT:      Head: Normocephalic and atraumatic.   Cardiovascular:      Pulses: Normal pulses.   Pulmonary:      Effort: Pulmonary effort is normal. No respiratory distress.   Musculoskeletal:      Cervical back: Normal range of motion and neck supple.      Comments: Right wrist: Mobile soft tissue mass over the volar right wrist.  It is overlying the FCR tendon and is mobile.  It appears to move when he flexes and extends the wrist.  It is mildly tender to palpation.  There is no overlying skin changes.  It is firm to palpation.  Sensation intact to light touch in radial, median and ulnar nerves.  Motor intact AIN, PIN, median and ulnar nerves.  Radial pulse 2+.   Skin:     General: Skin is warm and dry.      Capillary Refill: Capillary refill takes less than 2 seconds.   Neurological:      General: No focal deficit present.      Mental Status: He is alert and oriented to person, place, and time.   Psychiatric:         Mood and Affect: Mood normal.         Behavior: Behavior normal.         Imaging: Multiple views of the right wrist demonstrate no acute fractures or destructive osseous lesions.  There is a small soft tissue swelling on the volar right wrist that corresponds to the location of the mass seen on exam.    Assessment and Plan:  1.  Volar right wrist ganglion cyst    I counseled the patient " regarding the above clinical and imaging findings.  This is most consistent with a volar wrist ganglion cyst.  I suspect it is arising from the FCR tendon sheath.  We discussed the natural history of cyst and that they are benign.  We discussed options for treatment include observation, aspiration and surgical excision.  We discussed the risk of recurrence is quite high with aspiration and much lower with surgical excision.  He would like the treatment with the best chance for complete resolution and lowest risk for recurrence and thus he has elected to proceed with surgical excision.    A full PARQ was held with the patient. Both surgical and non-operative options were discussed. They decided on surgery through shared decision making. We discussed risks include infection, blood loss, DVT/PE, damage to neurovascular structures, chronic pain, recurrence, need for further surgery and unforeseeable events inherent to medical care. They also understand anesthesia will do a separate consent discussing risks inherent to anesthesia.They verbalized understanding and were in agreement to proceed with excision of right wrist volar ganglion cyst.    All questions were answered and they were in agreement with the plan.     Referrals: none  Restrictions: none  New medications/refills: none  Imaging studies: none  Follow-up: post op    Clyde Cunningham DO  Orthopedics and Orthopedic Oncology          [1]   Past Medical History:  Diagnosis Date    Allergy     Anxiety     Bronchitis     early 2000    COVID-19 02/03/2022    Positive covid test, Asymptomatic.     Depression     Diabetes (HCC)     High cholesterol     Hypertension     Pneumonia 02/2020    Renal disorder     cyst   [2]   Past Surgical History:  Procedure Laterality Date    INGUINAL HERNIA REPAIR ROBOTIC XI Bilateral 02/14/2022    Procedure: REPAIR, HERNIA, INGUINAL, ROBOT-ASSISTED, USING DA RENEE XI;  Surgeon: Man Hamm M.D.;  Location: SURGERY [facility];   Service: Gen Robotic    BONE SPUR EXCISION      HERNIA REPAIR     [3]   Outpatient Encounter Medications as of 7/1/2025   Medication Sig Dispense Refill    multivitamin Tab Take 1 Tablet by mouth every day.       No facility-administered encounter medications on file as of 7/1/2025.   [4]   Allergies  Allergen Reactions    Avocado      Sweating, tongue swelling, nausea   [5]   Social History  Tobacco Use   Smoking Status Some Days    Types: Cigars   Smokeless Tobacco Never   Tobacco Comments    2 cigars weekly

## 2025-07-03 ENCOUNTER — TELEPHONE (OUTPATIENT)
Dept: SURGERY | Facility: MEDICAL CENTER | Age: 55
End: 2025-07-03
Payer: COMMERCIAL

## 2025-07-14 ENCOUNTER — HOSPITAL ENCOUNTER (OUTPATIENT)
Facility: MEDICAL CENTER | Age: 55
End: 2025-07-14
Attending: ORTHOPAEDIC SURGERY | Admitting: ORTHOPAEDIC SURGERY
Payer: COMMERCIAL

## 2025-07-23 ENCOUNTER — APPOINTMENT (OUTPATIENT)
Dept: ADMISSIONS | Facility: MEDICAL CENTER | Age: 55
End: 2025-07-23
Attending: ORTHOPAEDIC SURGERY
Payer: COMMERCIAL

## 2025-07-28 ENCOUNTER — PRE-ADMISSION TESTING (OUTPATIENT)
Dept: ADMISSIONS | Facility: MEDICAL CENTER | Age: 55
End: 2025-07-28
Attending: ORTHOPAEDIC SURGERY
Payer: COMMERCIAL

## 2025-07-31 ENCOUNTER — PRE-ADMISSION TESTING (OUTPATIENT)
Dept: ADMISSIONS | Facility: MEDICAL CENTER | Age: 55
End: 2025-07-31
Attending: ORTHOPAEDIC SURGERY
Payer: COMMERCIAL

## 2025-08-14 ENCOUNTER — APPOINTMENT (OUTPATIENT)
Facility: MEDICAL CENTER | Age: 55
End: 2025-08-14
Payer: COMMERCIAL

## 2025-08-19 ENCOUNTER — OFFICE VISIT (OUTPATIENT)
Facility: MEDICAL CENTER | Age: 55
End: 2025-08-19
Payer: COMMERCIAL

## 2025-08-19 VITALS
DIASTOLIC BLOOD PRESSURE: 72 MMHG | BODY MASS INDEX: 25.93 KG/M2 | HEIGHT: 71 IN | SYSTOLIC BLOOD PRESSURE: 110 MMHG | HEART RATE: 77 BPM | WEIGHT: 185.19 LBS | OXYGEN SATURATION: 97 %

## 2025-08-19 DIAGNOSIS — M67.431 GANGLION CYST OF VOLAR ASPECT OF RIGHT WRIST: Primary | ICD-10-CM

## 2025-08-19 PROCEDURE — 20612 ASPIRATE/INJ GANGLION CYST: CPT | Mod: RT | Performed by: ORTHOPAEDIC SURGERY

## 2025-08-19 PROCEDURE — 3078F DIAST BP <80 MM HG: CPT | Performed by: ORTHOPAEDIC SURGERY

## 2025-08-19 PROCEDURE — 3074F SYST BP LT 130 MM HG: CPT | Performed by: ORTHOPAEDIC SURGERY

## 2025-08-19 RX ORDER — METHYLPREDNISOLONE ACETATE 40 MG/ML
40 INJECTION, SUSPENSION INTRA-ARTICULAR; INTRALESIONAL; INTRAMUSCULAR; SOFT TISSUE ONCE
Status: COMPLETED | OUTPATIENT
Start: 2025-08-19 | End: 2025-08-19

## 2025-08-19 RX ADMIN — Medication 2 ML: at 13:07

## 2025-08-19 RX ADMIN — METHYLPREDNISOLONE ACETATE 40 MG: 40 INJECTION, SUSPENSION INTRA-ARTICULAR; INTRALESIONAL; INTRAMUSCULAR; SOFT TISSUE at 13:08

## 2025-08-20 ASSESSMENT — ENCOUNTER SYMPTOMS
WEAKNESS: 0
TINGLING: 1
SHORTNESS OF BREATH: 0
NECK PAIN: 1

## (undated) DEVICE — GLOVES, #7 1/2 SENSICARE

## (undated) DEVICE — NEEDLE DRIVER MEGA SUTURECUT DA VINCI 15X'S REUSABLE

## (undated) DEVICE — GLOVE BIOGEL SZ 8 SURGICAL PF LTX - (50PR/BX 4BX/CA)

## (undated) DEVICE — SENSOR SPO2 NEO LNCS ADHESIVE (20/BX) SEE USER NOTES

## (undated) DEVICE — NEPTUNE 4 PORT MANIFOLD - (20/PK)

## (undated) DEVICE — CATHETER IV 18 GA X 1-3/4 ---SURG.& SDS ONLY---

## (undated) DEVICE — TOWEL STOP TIMEOUT SAFETY FLAG (40EA/CA)

## (undated) DEVICE — COVER TIP ENDOWRIST HOT SHEAR - (10EA/BX) DA VINCI

## (undated) DEVICE — SUTURE 2-0 20CM STRATAFIX SPIRAL SH NEEDLE (12/BX)

## (undated) DEVICE — ELECTRODE 850 FOAM ADHESIVE - HYDROGEL RADIOTRNSPRNT (50/PK)

## (undated) DEVICE — SUCTION INSTRUMENT YANKAUER BULBOUS TIP W/O VENT (50EA/CA)

## (undated) DEVICE — SEAL 5MM-8MM UNIVERSAL  BOX OF 10

## (undated) DEVICE — SLEEVE, VASO, THIGH, MED

## (undated) DEVICE — DERMABOND ADVANCED - (12EA/BX)

## (undated) DEVICE — SUTURE 4-0 MONOCRYL PLUS PS-2 - 27 INCH (36/BX)

## (undated) DEVICE — GLOVE BIOGEL PI ULTRATOUCH SZ 7.5 SURGICAL PF LF -(50/BX 4BX/CA)

## (undated) DEVICE — LACTATED RINGERS INJ 1000 ML - (14EA/CA 60CA/PF)

## (undated) DEVICE — TUBE CONNECT SUCTION CLEAR 120 X 1/4" (50EA/CA)"

## (undated) DEVICE — GOWN WARMING STANDARD FLEX - (30/CA)

## (undated) DEVICE — SODIUM CHL IRRIGATION 0.9% 1000ML (12EA/CA)

## (undated) DEVICE — SHEARS MONOPOLAR CURVED  DA VINCI 10X'S REUSABLE

## (undated) DEVICE — OBTURATOR BLADELESS STANDARD 8MM (6EA/BX)

## (undated) DEVICE — GLOVE BIOGEL PI INDICATOR SZ 7.5 SURGICAL PF LF -(50/BX 4BX/CA)

## (undated) DEVICE — ROBOTIC SURGERY SERVICES

## (undated) DEVICE — Device

## (undated) DEVICE — SYSTEM CLEARIFY VISUALIZATION (10EA/PK)

## (undated) DEVICE — HEAD HOLDER JUNIOR/ADULT

## (undated) DEVICE — SUTURE GENERAL

## (undated) DEVICE — GLOVE BIOGEL PI INDICATOR SZ 8.0 SURGICAL PF LF -(50/BX 4BX/CA)

## (undated) DEVICE — CANISTER SUCTION 3000ML MECHANICAL FILTER AUTO SHUTOFF MEDI-VAC NONSTERILE LF DISP  (40EA/CA)

## (undated) DEVICE — TUBING CLEARLINK DUO-VENT - C-FLO (48EA/CA)

## (undated) DEVICE — DRAPE ARM  BOX OF 20

## (undated) DEVICE — TROCAR 5X100 NON BLADED Z-TH - READ KII (6/BX)

## (undated) DEVICE — MASK ANESTHESIA ADULT  - (100/CA)

## (undated) DEVICE — ELECTRODE DUAL RETURN W/ CORD - (50/PK)

## (undated) DEVICE — KIT ANESTHESIA W/CIRCUIT & 3/LT BAG W/FILTER (20EA/CA)

## (undated) DEVICE — SET EXTENSION WITH 2 PORTS (48EA/CA) ***PART #2C8610 IS A SUBSTITUTE*****

## (undated) DEVICE — DRAPE COLUMN  BOX OF 20

## (undated) DEVICE — BLADE SURGICAL #15 - (50/BX 3BX/CA)

## (undated) DEVICE — PROTECTOR ULNA NERVE - (36PR/CA)

## (undated) DEVICE — SUTURE 3-0 VICRYL PLUS SH - 27 INCH (36/BX)